# Patient Record
Sex: FEMALE | Race: WHITE | NOT HISPANIC OR LATINO | Employment: FULL TIME | URBAN - METROPOLITAN AREA
[De-identification: names, ages, dates, MRNs, and addresses within clinical notes are randomized per-mention and may not be internally consistent; named-entity substitution may affect disease eponyms.]

---

## 2020-01-01 ENCOUNTER — APPOINTMENT (EMERGENCY)
Dept: RADIOLOGY | Facility: HOSPITAL | Age: 25
End: 2020-01-01
Payer: COMMERCIAL

## 2020-01-01 ENCOUNTER — HOSPITAL ENCOUNTER (EMERGENCY)
Facility: HOSPITAL | Age: 25
Discharge: HOME | End: 2020-01-01
Attending: EMERGENCY MEDICINE | Admitting: EMERGENCY MEDICINE
Payer: COMMERCIAL

## 2020-01-01 VITALS
HEIGHT: 61 IN | HEART RATE: 92 BPM | DIASTOLIC BLOOD PRESSURE: 69 MMHG | RESPIRATION RATE: 18 BRPM | WEIGHT: 125.66 LBS | BODY MASS INDEX: 23.73 KG/M2 | OXYGEN SATURATION: 97 % | TEMPERATURE: 98.5 F | SYSTOLIC BLOOD PRESSURE: 139 MMHG

## 2020-01-01 DIAGNOSIS — S42.002A CLOSED FRACTURE OF LEFT CLAVICLE: Primary | ICD-10-CM
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
## 2020-01-01 PROCEDURE — 73000 X-RAY EXAM OF COLLAR BONE: CPT

## 2020-01-01 PROCEDURE — 99283 EMERGENCY DEPT VISIT LOW MDM: CPT

## 2020-01-01 PROCEDURE — 99283 EMERGENCY DEPT VISIT LOW MDM: CPT | Performed by: EMERGENCY MEDICINE

## 2020-01-01 PROCEDURE — 73030 X-RAY EXAM OF SHOULDER: CPT

## 2020-01-01 RX ORDER — IBUPROFEN 600 MG/1
600 TABLET ORAL ONCE
Status: COMPLETED | OUTPATIENT
Start: 2020-01-01 | End: 2020-01-01

## 2020-01-01 RX ORDER — IBUPROFEN 600 MG/1
600 TABLET ORAL EVERY 6 HOURS PRN
Qty: 30 TABLET | Refills: 0 | Status: SHIPPED | OUTPATIENT
Start: 2020-01-01

## 2020-01-01 RX ORDER — ACETAMINOPHEN 325 MG/1
650 TABLET ORAL ONCE
Status: COMPLETED | OUTPATIENT
Start: 2020-01-01 | End: 2020-01-01

## 2020-01-01 RX ORDER — HYDROCODONE BITARTRATE AND ACETAMINOPHEN 5; 325 MG/1; MG/1
1 TABLET ORAL ONCE
Status: COMPLETED | OUTPATIENT
Start: 2020-01-01 | End: 2020-01-01

## 2020-01-01 RX ORDER — HYDROCODONE BITARTRATE AND ACETAMINOPHEN 5; 325 MG/1; MG/1
1 TABLET ORAL EVERY 6 HOURS PRN
Qty: 8 TABLET | Refills: 0 | Status: SHIPPED | OUTPATIENT
Start: 2020-01-01 | End: 2020-01-11

## 2020-01-01 RX ADMIN — HYDROCODONE BITARTRATE AND ACETAMINOPHEN 1 TABLET: 5; 325 TABLET ORAL at 17:19

## 2020-01-01 RX ADMIN — IBUPROFEN 600 MG: 600 TABLET ORAL at 17:19

## 2020-01-01 RX ADMIN — ACETAMINOPHEN 650 MG: 325 TABLET, FILM COATED ORAL at 17:19

## 2020-01-01 NOTE — ED PROVIDER NOTES
History  Chief Complaint   Patient presents with    Shoulder Injury     pt presents after falling snowboarding with injury to CARMEL boudreaux  Patient is a 20-year-old female with no significant past medical history, presents to the emergency department complaining of left collarbone pain after she fell while snowboarding today  Patient reports she was helmeted and took a tumble but is not sure how she landed  Her boyfriend reports she landed on her face and chest   She is unsure if she had her helmeted head but denies loss of consciousness  Since the fall she has been having pain in the left clavicle and it is worsened by deep inhalation and with movement of her left shoulder joint  She denies any neck or back pain, chest pain, dyspnea, palpitations, cough or hemoptysis, extremity weakness or paresthesia, abdominal pain, nausea, vomiting  Rest of review of systems is negative  Patient is right-hand dominant  History provided by:  Patient and significant other   used: No    Shoulder Injury   Associated symptoms: no back pain, no fever and no neck pain        None       History reviewed  No pertinent past medical history  Past Surgical History:   Procedure Laterality Date    REPAIR FLEXOR TENDON HAND Right        History reviewed  No pertinent family history  I have reviewed and agree with the history as documented  Social History     Tobacco Use    Smoking status: Current Some Day Smoker    Smokeless tobacco: Never Used   Substance Use Topics    Alcohol use: Not Currently     Frequency: Never     Comment: occasioanlly    Drug use: Not Currently        Review of Systems   Constitutional: Negative for chills and fever  HENT: Negative for congestion, ear pain, rhinorrhea and sore throat  Eyes: Negative for pain and visual disturbance  Respiratory: Negative for cough, chest tightness, shortness of breath and wheezing      Cardiovascular: Negative for chest pain and palpitations  Gastrointestinal: Negative for abdominal pain, constipation, diarrhea, nausea and vomiting  Genitourinary: Negative for dysuria, flank pain, frequency and hematuria  Musculoskeletal: Negative for back pain and neck pain  +Left collarbone pain s/p injury  Skin: Negative for color change, pallor, rash and wound  Allergic/Immunologic: Negative for immunocompromised state  Neurological: Negative for dizziness, syncope, weakness, light-headedness, numbness and headaches  Hematological: Negative for adenopathy  Does not bruise/bleed easily  Psychiatric/Behavioral: Negative for confusion and decreased concentration  All other systems reviewed and are negative  Physical Exam  Physical Exam   Constitutional: She is oriented to person, place, and time  She appears well-developed and well-nourished  No distress  HENT:   Head: Normocephalic and atraumatic  Mouth/Throat: Oropharynx is clear and moist  No oropharyngeal exudate  Eyes: Pupils are equal, round, and reactive to light  Conjunctivae and EOM are normal    Neck: Normal range of motion  Neck supple  No JVD present  Cardiovascular: Normal rate, regular rhythm, normal heart sounds and intact distal pulses  Exam reveals no gallop and no friction rub  No murmur heard  Pulmonary/Chest: Effort normal and breath sounds normal  No respiratory distress  She has no wheezes  She has no rales  She exhibits no tenderness  Abdominal: Soft  Bowel sounds are normal  She exhibits no distension  There is no tenderness  There is no rebound and no guarding  Musculoskeletal: Normal range of motion  She exhibits no edema or tenderness  Tenderness over the left mid clavicle without obvious deformity or skin tenting  Decreased range of motion left shoulder due to pain reproduced in the clavicle  No bony tenderness of the left scapula, left chest wall, left shoulder or upper arm    All extremities are neurovascularly intact including left upper extremity  No midline cervical, thoracic or lumbar spine tenderness  Neurological: She is alert and oriented to person, place, and time  No gross motor or sensory deficits  Skin: Skin is warm and dry  No rash noted  She is not diaphoretic  No erythema  No pallor  Psychiatric: She has a normal mood and affect  Her behavior is normal    Nursing note and vitals reviewed  Vital Signs  ED Triage Vitals [01/01/20 1610]   Temperature Pulse Respirations Blood Pressure SpO2   98 5 °F (36 9 °C) 92 18 139/69 97 %      Temp Source Heart Rate Source Patient Position - Orthostatic VS BP Location FiO2 (%)   Oral Monitor Sitting Right arm --      Pain Score       8         Vitals:    01/01/20 1610 01/01/20 1611   BP: 139/69    BP Location: Right arm    Pulse: 92    Resp: 18    Temp: 98 5 °F (36 9 °C)    TempSrc: Oral    SpO2: 97%    Weight:  57 kg (125 lb 10 6 oz)   Height:  5' 1" (1 549 m)       Visual Acuity      ED Medications  Medications   ibuprofen (MOTRIN) tablet 600 mg (has no administration in time range)   acetaminophen (TYLENOL) tablet 650 mg (has no administration in time range)   HYDROcodone-acetaminophen (NORCO) 5-325 mg per tablet 1 tablet (has no administration in time range)       Diagnostic Studies  Results Reviewed     None                 XR clavicle LEFT   ED Interpretation by Steffen Holland DO (01/01 1659)   Acute midshaft left clavicle fracture  XR shoulder 2+ views LEFT   ED Interpretation by Steffen Holland DO (01/01 1659)   Acute midshaft left clavicle fracture  No osseous abnormality in the shoulder joint  Procedures  Procedures         ED Course                               MDM  Number of Diagnoses or Management Options  Closed fracture of left clavicle: Fall involving snowboard as cause of accidental injury:   Diagnosis management comments: 49-year-old female presents status post fall while snowboarding complaining of left collarbone injury    X-rays were ordered by nursing of the left shoulder and left clavicle which does show acute fracture of the midshaft left clavicle  Will place in a sling, provide ibuprofen and Tylenol for symptomatic relief  Patient is not from this area and she plans to return home either tonight or tomorrow and I advised her to follow up with an orthopedic surgeon upon return home  Discussed ED return parameters  I have reasonably determine that electronically prescribing a controlled substance would be impractical for the patient to obtain the controlled substance prescribed by electronic prescription or would cause an untimely delay resulting in an adverse impact on the patient's medical condition   Amount and/or Complexity of Data Reviewed  Tests in the radiology section of CPT®: ordered and reviewed  Independent visualization of images, tracings, or specimens: yes          Disposition  Final diagnoses:   Closed fracture of left clavicle   Fall involving snowboard as cause of accidental injury     Time reflects when diagnosis was documented in both MDM as applicable and the Disposition within this note     Time User Action Codes Description Comment    1/1/2020  5:07 PM Jose E Hong [[id number]] Closed fracture of left clavicle     1/1/2020  5:07 PM Jose E Hong [[id number]] Fall involving snowboard as cause of accidental injury       ED Disposition     ED Disposition Condition Date/Time Comment    Discharge Stable Wed Jan 1, 2020  5:07 PM Sanjiv Conley discharge to home/self care              Follow-up Information     Follow up With Specialties Details Why Contact Info Additional Information    Orthopedic surgeon  Schedule an appointment as soon as possible for a visit        [id number] Emergency Department Emergency Medicine Go to  If symptoms worsen [address] [place] [id number]  [address] [place] [address], [address] [place] [address], [facility] Patient's Medications   Discharge Prescriptions    HYDROCODONE-ACETAMINOPHEN (NORCO) 5-325 MG PER TABLET    Take 1 tablet by mouth every 6 (six) hours as needed (severe pain) for up to 10 daysMax Daily Amount: 4 tablets       Start Date: 1/1/2020  End Date: 1/11/2020       Order Dose: 1 tablet       Quantity: 8 tablet    Refills: 0    IBUPROFEN (MOTRIN) 600 MG TABLET    Take 1 tablet (600 mg total) by mouth every 6 (six) hours as needed for mild pain or moderate pain       Start Date: 1/1/2020  End Date: --       Order Dose: 600 mg       Quantity: 30 tablet    Refills: 0     No discharge procedures on file      ED Provider  Electronically Signed by           Yessenia Owens DO  01/01/20 8996

## 2021-09-27 ENCOUNTER — TRANSCRIBE ORDERS (OUTPATIENT)
Dept: SCHEDULING | Facility: REHABILITATION | Age: 26
End: 2021-09-27
Payer: COMMERCIAL

## 2021-09-27 DIAGNOSIS — S06.0X0A CONCUSSION WITHOUT LOSS OF CONSCIOUSNESS, INITIAL ENCOUNTER: Primary | ICD-10-CM

## 2021-10-21 ENCOUNTER — HOSPITAL ENCOUNTER (OUTPATIENT)
Dept: PHYSICAL THERAPY | Age: 26
Setting detail: THERAPIES SERIES
Discharge: HOME | End: 2021-10-21
Attending: PHYSICAL MEDICINE & REHABILITATION
Payer: COMMERCIAL

## 2021-10-21 ENCOUNTER — HOSPITAL ENCOUNTER (OUTPATIENT)
Dept: OCCUPATIONAL THERAPY | Age: 26
Setting detail: THERAPIES SERIES
Discharge: HOME | End: 2021-10-21
Attending: PHYSICAL MEDICINE & REHABILITATION
Payer: COMMERCIAL

## 2021-10-21 ENCOUNTER — TELEPHONE (OUTPATIENT)
Dept: REGISTRATION | Facility: CLINIC | Age: 26
End: 2021-10-21

## 2021-10-21 DIAGNOSIS — S06.0X0A CONCUSSION WITHOUT LOSS OF CONSCIOUSNESS, INITIAL ENCOUNTER: Primary | ICD-10-CM

## 2021-10-21 DIAGNOSIS — S06.0X0A CONCUSSION WITHOUT LOSS OF CONSCIOUSNESS, INITIAL ENCOUNTER: ICD-10-CM

## 2021-10-21 PROCEDURE — 97530 THERAPEUTIC ACTIVITIES: CPT | Mod: GP

## 2021-10-21 PROCEDURE — 97166 OT EVAL MOD COMPLEX 45 MIN: CPT | Mod: GO

## 2021-10-21 PROCEDURE — 97112 NEUROMUSCULAR REEDUCATION: CPT | Mod: GP

## 2021-10-21 PROCEDURE — 97161 PT EVAL LOW COMPLEX 20 MIN: CPT | Mod: GP

## 2021-10-21 RX ORDER — SUMATRIPTAN SUCCINATE 100 MG/1
100 TABLET ORAL ONCE AS NEEDED
COMMUNITY

## 2021-10-21 RX ORDER — AMITRIPTYLINE HYDROCHLORIDE 25 MG/1
25 TABLET, FILM COATED ORAL NIGHTLY
COMMUNITY

## 2021-10-21 ASSESSMENT — COGNITIVE AND FUNCTIONAL STATUS - GENERAL: AFFECT: WFL

## 2021-10-21 NOTE — OP PT TREATMENT LOG
VESTIBULAR PT FLOWSHEET    P.T. TREATMENT LOG   Precautions:mild phono/photosensitivity   Eval Date: 10/21 Progress Note: 11/21   POC expires:12/21 Insurance Limits:   Treatment Current Session Time   CANALITH  REPOSITIONING TREATMENT  (CPT 07387) Y/N Notes Not performed   CRT      NEURO RE-ED  (CPT 10725) Y/N Notes 8-22 Minutes   BPPV ASSESSMENT Y (-) for BPPV in all canals  Possible R hypofunction   VOR CANCELLATION                      Standing H/VVOR-C     Ambulation w/ H/VVOR-C     VOR / GAZE STABILITY     Standing H/VVOR  1 inch B performed seated  HVOR: SSV x 1 minute  VVOR: SSV x 1 minute   Ambulation w/H/VVOR     H/VVOR on compliant surfaces     Functional VOR     HABITUATION     Ball circles     Repetitive functional movements     BALANCE- STATIC     On floor     Airex foam     Rockerboard     BALANCE- DYNAMIC     Ambulation-head turns/nods     Ambulation - 180 & 360 degree turns     Retro ambulation EO     Ambulation with EC     Obstacles     Tandem     C/S KINESTHESIA/ COORDINATION     MULTITASKING     OKS     *Objective Measures   TBA: FGA, BCTT, DVA/GST   THER-EX   (CPT 46874) Y/N SETS REPS LOAD Not performed   STRETCHING        Chin tucks (supine, seated)        Upper Trapezius Stretch        Levator Scapula stretch        Scalene/SCM stretch        Doorway Pectoralis stretch         Open Book/ thread the needle thoracic rotation stretches                ROM        SNAGS for rotation        SNAGS for extension        Supine snow angels        Seated thoracic extension                STRENGTHENING        Cervical Stabilization        Cervical isometrics (c/s rot, flex, ext, SB, capital flex)        Resisted chin tucks w/ TB        Cervical retractions prone on elbows        Deep cervical flexor strengthening                Scapular Stabilization        Seated scap retraction/depression        No money (B/L UE ER w/ TB)        TB rows/overhead rows        TB shoulder extension         TB shoulder horiz  ABD        Standing chin tuck with shoulder flexion/abduction        W to Y's (supine or standing)        Prone T, Y, I         Modified supermans                CARDIOVASCULAR     UBE     Recumbent bike     Treadmill/BCTT     MANUAL  (CPT 01471) Y/N  Not performed   STM/MFR/TPR     IASTM     Joint Mobilizations           E-STIM UNATTENDED  (CPT ) Y/N  Not performed   TENS/ H-wave      HOT / COLD PACKS  (CPT 70883) Y/N  Not performed   Heat     Ice     THER ACT  (CPT 00993) Y/N  8-22 Minutes   Review pain, meds, falls, vitals, symptoms yes    *Subjective Measures    yes [place]: 41  ABC: 90%  DHI: 34%   Patient Education/HEP

## 2021-10-21 NOTE — TELEPHONE ENCOUNTER
offered PT/OT on 10/28  and 11/2 at 1&2pm  to makeup missed frequencies.  Requested call back to [facility], please transfer if she calls back.

## 2021-10-21 NOTE — OP OT TREATMENT LOG
VISION/CONCUSSION OT FLOW SHEET    OT Vision/mTBI  EXERCISES CURRENT SESSION TIME   NEURO RE-ED  CPT 75877 TOTAL TIME FOR SESSION 53-67 Minutes   Initial Evaluation Initial evaluation completed 10/21/21.  See OT note for findings.    Dynavision     VTS3/VTS4     CPT     BITs     NVR     Saccadic Fixation     Ocular Motor Control     Visual Tracing     3D Tracking     Visual Perception     Convergence/Divergence     Accommodation     Visual Stimulation     THER ACT  CPT 94695 TOTAL TIME FOR SESSION Not performed   Pain, Vitals, Meds, Etc.     HEP     Visual Endurance      Work/School Simulation      SELF CARE  CPT 51640 TOTAL TIME FOR SESSION Not performed   PCS Symptom Management/Education Issued and educated pt on 1-10 PCS symptom scale.  Pt demonstrates good understanding of education.    ADL/IADLs     GROUP  CPT 47281 TOTAL TIME FOR SESSION Not performed

## 2021-10-21 NOTE — PROGRESS NOTES
Referring Provider: By co-signing this Plan of Care (POC) either electronically or physically you agree to the following:    I have reviewed the the Plan of Care established by the therapist within this document and certify that the services are skilled and medically necessary. I have reviewed the plan and recommend that these services continue to meet the goals stated in this document.       EXTERNAL PROVIDER FAXING BACK:    PHYSICIAN SIGNATURE: __________________________________     DATE: ___________________   TIME: _________    IMPORTANT:  If returning this Plan of Care by fax, please fax back ONLY the signature page.   _____________________________________________________________________      [facility] Fax: [phone]      OT EVALUATION FOR OUTPATIENT THERAPY    Patient: Trini Valenzuela   MRN: [id number]  : 1995 26 y.o.  Referring Physician: Dipesh Roth DO  Date of Visit: 10/21/2021    Certification Dates:   10/21/21 through 22    Recommended Frequency & Duration:  Other for up to 3 months   1-2x/wk    Diagnosis:   1. Concussion without loss of consciousness, initial encounter          Chief Complaints:   Chief Complaint   Patient presents with   • Visual Deficits   • Cognition       Precautions: no known precautions/restrictions    Past Medical History: No past medical history on file.    Past Surgical History: No past surgical history on file.      LEARNING ASSESSMENT    Assessment completed: Yes    Learner name:  Trini    Relationship: Patient    Learning Barriers:  Learning barriers: No Barriers    Preferred Language: English     Needed: No    Learning New Concepts: Listening and Reading        CO-LEARNER ASSESSMENT:    Completed: No            OBJECTIVE MEASUREMENTS/DATA:     Assessment - 10/21/21 1214        Assessment    Plan of Care reviewed and patient/family in agreement Yes     System Pathology/Pathophysiology Noted neuromuscular   vision system     Functional Limitations in Following Categories work; community/leisure     Problem List: Occupational Therapy impaired cognition   visual deficits    Rehab Potential/Prognosis: Occupational Therapy good, to achieve stated therapy goals     Clinical Assessment Pt is a 26 y.o. female that presents to OP OT for an initial evaluation s/p fall from chair resulting in a concussion.  Pt c/o mild to  moderate ocular discomfort in nearly all directions.  Her eye teaming is impaired including convergence, divergence, accommodations, and smooth pursuits, as well as her saccades and KD are slow.  She reports moderate to severe symptoms as seen on the [place] with a score of 41.  She is currently working 6hrs/day x 5days/wk but reports she is very fatigued when she is finished working.  She has been able to get back to light physical activity but reports feelings of off balance with any cardio or endurance based activities.  Pt would benefit from OP OT to maximize functional vision for return to work, IADLs, and leisure pursuits.  Did not complete DYNAVISION due to time constraint.  Will need to complete at next session.     Plan and Recommendations Complete DYNAVISION at next session.     Planned Services CPT 24744 Neuromuscular Reeducation; CPT 00464 Self-care/Home management training; CPT 40482 Therapeutic activities; CPT 22841 Hot/Cold Packs therapy                General Information - 10/21/21 1214        General Information    Document Type initial evaluation     Onset of Illness/Injury or Date of Surgery 09/02/21     Referring Physician Dr. Roth     Pertinent History of Current Functional Problem Pt is a 26 y.o. female that was out to dinner on 9/2/21.   She was sitting at a hightop table when her chair fell backwards and she hit her head on the concrete.  She saw her PCP a few days later and was diagnosed with a concussion, CT head (-).  She was referred to Dr. Roth who referred pt to OP OT and PT.  She is back  to working 6hrs/day x 5days/wk.  Her symptoms have improved since initial incident and she has resumed light physical activity.  She continues to have moderate PCS and reports taking breaks throughout her day.     Patient/Family/Caregiver Comments/Observations Pt arrives to OP OT for IE.     Limitations/Impairments visual     Existing Precautions/Restrictions no known precautions/restrictions        OT Time Calculation    Start Time 1202     Stop Time 1300     Time Calculation (min) 58 min                Pain/Vitals - 10/21/21 1214        Pain Assessment    Currently in pain Yes     Preferred Pain Scale number (Numeric Rating Pain Scale)     Pain: Body location Head     Pain Rating (0-10): Pre Activity 1     Pain Rating (0-10): Activity 2     Pain Rating (0-10): Post Activity 2        Pain Interventions    Intervention  rest prn, low stim     Post Intervention Comments same as above                OT - 10/21/21 1214        Occupational Therapy Encounter Type Details    Occupational Therapy Neuro     Document Type initial evaluation        OT Frequency and Duration    Frequency of treatment Other     OT Duration 3 months     OT Cert From 10/21/21     OT Cert To 01/19/22     OT Custom Frequency and Duration 1-2x/wk     Date OT POC was sent to provider 10/21/21     Signed OT Plan of Care received?  No                Falls Assessment - 10/21/21 1214        Initial Falls Assessment    One or more falls in the last year No                 PLOF:    Prior Level of Function - 10/21/21 1214        OTHER    Previous level of function Pt was independent with all ADLs and IADLs, driving unlimited distances, working FT               Living Environment    Living Environment - 10/21/21 1214        Living Environment    People in Home alone     Living Arrangements apartment     Living Environment Comment Elevator access but pt typically uses steps.  She does get dizzy occasionaly     Transportation Concerns car, none                Cognition     Cognition - 10/21/21 1214        Cognition/Psychosocial    Affect/Mental Status (Cognition) WFL     Behavioral Issues (Cognition) difficulty managing stress; overwhelmed easily     Orientation Status (Cognition) oriented x 4     Follows Commands (Cognition) increased processing time needed; repetition of directions required     Cognitive Function attention deficit     Attention Deficit (Cognition) concentration; focused/sustained attention; selective attention; divided attention; alternating attention     Comment, Attention Difficulty keeping attention when multi-tasking               Reading and Writing     Reading and Writing - 10/21/21 1214        Reading and Writing Assessment    Reading (comments) Pt is able to tolerate reading both hard copy and on screens.  She is able to tolerate for >1hr.  Screens are dimmed and pt uses blue light glasses.     Writing (comments) Pt has no difficulty with physically writing/typing/texting.  She reports increased time and frequent errors when getting her thoughts out into words.               BADL/IADL    BADL/IADL - 10/21/21 1214        BADL Interventions Assessment    Upper Body Dressing Independent     Lower Body Dressing Independent     Bathing Independent     Toileting Independent     Grooming Independent     Eating Independent        IADL/Home Interventions Assessment    Driving and community mobility comments Pt is driving local distances only.     Financial management comments Pt report no issues.     Medication management comments Pt reports no issues.     Home management/maintenance comments Pt reports no issues.     Meal prep / clean up comments Pt reports no issues.               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  She currently works from home.     Leisure / Social Participation (comments) Pt  enjoys going out to eat, to bars, listening to music, and working out.     Exercise Assessment (comments) Pt enjoys working out and has been able to get back to free weights but not cardio.  She has tried getting back into cardio but is able to tolerate it for only a few minutes before her HA kicks in.  She is able to walk on the treadmill for >30min but does feel slightly off balance.               Vision     Vision - 10/21/21 1214        Vision Assessment    Visual Impairment/Limitations corrective lenses for distance   has non-RX blue light glasses for the computer       Oculomotor Control    Left eye assessed? Yes     Right eye assessed? Yes     Superior assessed? Yes     Inferior assessed? Yes     Diagonal assessed? Yes     Circular assessed? Yes     Left AROM without target ROM Intact     Left oculomotor AROM Discomfort Mild   squinting    Left gaze fixation (10 second hold) Able      Right AROM without target ROM Intact      Right oculomotor AROM Discomfort None     Right gaze fixation (10 second hold) Able     Superior AROM without target ROM Intact      Superior oculomotor AROM Discomfort Moderate   squinting    Superior gaze fixation (10 second hold) Able     Inferior AROM without target ROM Intact     Inferior oculomotor AROM Discomfort Mild     Inferior gaze fixation (10 second hold) Able     Diagonal AROM without target ROM Intact     Diagonal oculomotor AROM Discomfort Moderate     Diagonal gaze fixation (10 second hold) Able     Circular AROM without target ROM Impaired   choppy    Circular oculomotor AROM Discomfort None        Eye Teaming    Convergence Impaired   8''    Divergence Impaired     Accommodation Impaired   1 error; slow; difficulty tracking    Smooth Pursuits Impaired   difficulty maintaining target fixation    3D Tracking Intact     Nystagmus No        Saccadic Fixation Speed    Vertical Saccadic Fixation Impaired     Horizontal Saccadic Fixation Impaired     Horizontal Saccades H1  11.04 Seconds     Horizontal Saccades H2 10.41 Seconds     Horizontal Saccades H3 11.58 Seconds     Horizontal Saccades H4 8.94 Seconds     Horizontal Saccades trials average 10.49 Seconds     Vertical Saccades V1 10.14 Seconds     Vertical Saccades V2 9.71 Seconds     Vertical Saccades V3 8.78 Seconds     Vertical Saccades V4 8.68 Seconds     Vertical Saccades trials average 9.33 Seconds     Devendra Devick Test #1 (seconds) 23.37 Seconds     Devendra Devick Test #2 (seconds) 27.41 Seconds     Devendra Devick Test #3 (seconds) 32.65 Seconds     Devendra Devick Total Time 83.43 Seconds     Devendra Devick Errors #1 0     Devendra Devick Errors #2 0     Devendra Devick Errors #3 0     Devendra Devick Total Errors 0        Symptoms and Outcome Measures    Symptoms Anxiety; Cognitive changes; Cognitive Fatigue; Dizziness; Fatigue; Fogginess; Headache; Imbalance; Irritability; Mood swings; Multi-stimulus intolerance; Phonophobia; Photophobia; Sleep disturbance; Slowed processing; Visual changes     [place] Score 41                   GOALS:    Goals        Patient Stated    •  <enter goal here> (pt-stated)        Other    •  Mutually agreed upon pain goal       Mutually agreed upon pain goal: 0/10      •  OT goals              Short Term Goals Time Frame Result Comment/Progress   Full ocular motor range of motion and control with mild discomfort 4 weeks       Visual reaction time within or less than .85 second via Dynavision with minimal symptoms 4 weeks       Saccadic fixation speed of less than 57 seconds as measured by the Devendra Devick Test with minimal symptoms 4 weeks      Convergence less than or equal to 7 inches for near-focus tasks of reading and computer use with minimal symptoms 4 weeks     Visual perceptual skills via MVTP4 with standard score equivalent to age range 4 weeks     Patient will perform IADLs, home management and community activities with mild symptoms as evidenced by a 5-10pt reduction on the [place] Post Concussion  Questionnaire 4 weeks        Long Term Goals Time Frame Result Comment/Progress   Patient will complete necessary reading for work/leisure/school, with accommodations if indicated, with absent or manageable symptoms 12 weeks   with ability to read for > 60 minutes without + in PCS symptoms   Patient will utilize computer for work/leisure/school tasks with absent or manageable symptoms 12 weeks   with ability to utilize computer for > 60 minutes without + in PCS symptoms   Patient will return to work/school with full or modified duties with absent or manageable symptoms 12 weeks       Patient will perform IADLs and community activities with absent or manageable symptoms; as measured by improvement in [place] Questionnaire by <20 points. 12 weeks       Patient will be independent with visual home exercise program 12 weeks       Visual reaction time within or less than .75 seconds via Dynavision with minimal symptoms 12 weeks                      TREATMENT PLAN:    VISION/CONCUSSION OT FLOW SHEET    OT Vision/mTBI  EXERCISES CURRENT SESSION TIME   NEURO RE-ED  CPT 13240 TOTAL TIME FOR SESSION 53-67 Minutes   Initial Evaluation Initial evaluation completed 10/21/21.  See OT note for findings.    Dynavision     VTS3/VTS4     CPT     BITs     NVR     Saccadic Fixation     Ocular Motor Control     Visual Tracing     3D Tracking     Visual Perception     Convergence/Divergence     Accommodation     Visual Stimulation     THER ACT  CPT 66167 TOTAL TIME FOR SESSION Not performed   Pain, Vitals, Meds, Etc.     HEP     Visual Endurance      Work/School Simulation      SELF CARE  CPT 08385 TOTAL TIME FOR SESSION Not performed   PCS Symptom Management/Education Issued and reviewed 1-10 PCS symptom scale.  Pt demonstrates good understanding of education.     ADL/IADLs     GROUP  CPT 72545 TOTAL TIME FOR SESSION Not performed                                                                                          This 26 y.o.  year old female presents to OT with above stated diagnosis. Occupational Therapy evaluation reveals impaired cognition (visual deficits) resulting in work, community/leisure limitations. Trini Valenzuela will benefit from skilled OT services to address limitation, work towards rehab and patient goals and maximize PLOF of chosen ADLs.     Planned Services: The patient’s treatment will include CPT 50947 Neuromuscular Reeducation, CPT 14895 Self-care/Home management training, CPT 93224 Therapeutic activities, CPT 93399 Hot/Cold Packs therapy, .

## 2021-10-21 NOTE — PROGRESS NOTES
Referring Provider: By co-signing this Plan of Care (POC) either electronically or physically you agree to the following:    I have reviewed the the Plan of Care established by the therapist within this document and certify that the services are skilled and medically necessary. I have reviewed the plan and recommend that these services continue to meet the goals stated in this document.       EXTERNAL PROVIDER FAXING BACK:    PHYSICIAN SIGNATURE: __________________________________     DATE: ___________________  TIME: _____________    IMPORTANT:  If returning this Plan of Care by fax, please fax back ONLY the signature page.   _________________________________________________________________________      [facility] Fax: [phone]        PT EVALUATION FOR OUTPATIENT THERAPY    Patient: Trini Valenzuela    MRN: [id number]  : 1995 26 y.o.   Referring Physician: Dipesh Roth DO  Date of Visit: 10/21/2021      Certification Dates:  10/21/21 through 21         Recommended Frequency & Duration:  Other (1-2x/week) for up to 8 weeks     Diagnosis:   1. Concussion without loss of consciousness, initial encounter        Chief Complaints:   Chief Complaint   Patient presents with   • Dizziness   • Pain     headache   • Impaired Physiological Response To Inc Hr   • Decreased recreational/play activity       Precautions: no known precautions/restrictions    Past Medical History: History reviewed. No pertinent past medical history.    Past Surgical History: History reviewed. No pertinent surgical history.      LEARNING ASSESSMENT    Assessment completed:  Yes    Learner name:  Trini    Relationship: Patient    Learning Barriers:  Learning barriers: No Barriers    Preferred Language: English     Needed: No    Learning New Concepts: Listening, Reading, Demonstration and Pictures/Video    Education Provided: Role of PT, Vestibular anatomy and pathophysiology of concussion. Issued  Concussion folder with Not Just a Bump on the Head pamphlet and additional handouts.       CO-LEARNER ASSESSMENT:    Completed: No            OBJECTIVE MEASUREMENTS/DATA:     Assessment and Plan - 10/21/21 1522        Assessment    Plan of Care reviewed and patient/family in agreement Yes     System Pathology/Pathophysiology Noted vestibular; cardiovascular     Functional Limitations in Following Categories (PT Eval) self-care; home management; work; community/leisure     Rehab Potential/Prognosis good, to achieve stated therapy goals     Problem List decreased endurance; dizziness; gaze stabilization; impaired balance; impaired motor control; impaired postural control; impaired sensory feedback; motion sensitivity     Clinical Assessment Trini presents to outpatient vestibular PT following a concussion where she fell backwards and hit her head in September 2021. At this time primary complaints reflect foggiess, dizziness, and limited activity tolerance as well as sleep, phonophobia and balance impairments. Objective measures this date are consistent with right vestibular hypofunction following trauma and from subjective report, considering impaired physiologic response to changes in heart rate. Additional testing is required to ascertain deficits in Dynamic Visual Acuity, Gaze Stabilization, Functional Gait Assessment and BCTT. She will benefit from a course of care at outpatient vestibular PT to address impairments and optimize mobility.     Plan and Recommendations Assess DVA/GST, Issue walking program for 30-40% max HR to prepare for BCTT.     Planned Services CPT 04880 Canalith repositioning procedure/maneuvers; CPT 70126 Gait training; CPT 84542 Manual therapy; CPT 96813 Neuromuscular Reeducation; CPT 18471 Therapeutic activities; CPT 51077 Therapeutic exercises; CPT 46928 Therapeutic Massage; CPT 76830 Hot/Cold Packs therapy                General Information - 10/21/21 1522        Session Details     Document Type initial evaluation     Mode of Treatment physical therapy     Patient/Family/Caregiver Comments/Observations Patient received via OT handoff     OP Specialty Concussion        Time Calculation    Start Time 1300     Stop Time 1355     Time Calculation (min) 55 min        General Information    Onset of Illness/Injury or Date of Surgery 09/02/21     Referring Physician Dr. Roth     Pertinent History of Current Functional Problem Per Dr. Roth: She is a 26-year-old female who sustained a concussion on 9/2/2021 as result of falling backwards out of a high back chair in a restaurant. She struck her head on the floor. She not experience any loss of consciousness, seizure activity, or posttraumatic amnesia. She not present to the ER. No brain imaging has been obtained. She followed up with her primary care physician on 9/7/2021. She was diagnosed with a concussion and advised on relative cognitive and physical rest. A non-contrast head CT was scheduled for 9/28/2021. She took the first week following her injury off from work then attempted to return to work full-time but was unable to do so due to increased symptoms. She resume working half days on 9/20/2021. Currently her symptoms include headaches which she describes as located over the vertex, moderate to severe in severity, pressure-like in quality, constant, and not significantly altered with Tylenol. She is endorsing dizziness which she describes more of a lightheadedness and fogginess; she denies any vertigo, loss of balance, falls. She does admit to visual changes with blurred vision and photophobia; she denies any diplopia. She does wear reading glasses at baseline and her last eye exam was in December 2020. She is experiencing phonophobia, cognitive slowing and fogging, impaired concentration memory, excessive fatigue, and mood instability with feelings of increased irritability and anxiety as well as insomnia with difficulty with sleep  induction and maintenance. Her symptom severity score as rated on scat 5 was 51. Her reading tolerance is 30 to 60 minutes in duration. She is working part-time albeit with increased symptoms as noted above. She has resumed driving. There has been no head injury subsequent to that occurred on 9/2/2021     Limitations/Impairments visual     Existing Precautions/Restrictions no known precautions/restrictions                Pain/Vitals - 10/21/21 1522        Pain Assessment    Currently in pain Yes     Preferred Pain Scale number (Numeric Rating Pain Scale)     Pain Side/Orientation right     Pain: Body location Head; Eye     Pain Rating (0-10): Pre Activity 1     Pain Rating (0-10): Activity 2     Pain Rating (0-10): Post Activity 2     Nonverbal Indicators of Pain grimace; guarding; restless        Pain Intervention    Intervention  IE     Post Intervention Comments IE                Falls - 10/21/21 1236        Initial Falls Assessment    One or more falls in the last year Yes     How many times 1     Was the patient injured in any fall Yes     Recommended plan to address falls Concussion Rehab                PT - 10/21/21 1522        Physical Therapy    Physical Therapy Vestibular        PT Plan    Frequency of treatment Other   1-2x/week    PT Duration 8 weeks     PT Cert From 10/21/21     PT Cert To 12/20/21     Date PT POC was sent to provider 10/21/21     Signed PT Plan of Care received?  No                   Living Environment    Living Environment - 10/21/21 1236        Living Environment    People in Home alone     Living Arrangements apartment     Living Environment Comment Elevator access but pt typically uses steps. She does get dizzy occasionaly     Transportation Concerns car, none               PLOF:    Prior Level of Function - 10/21/21 1522        OTHER    Previous level of function Pt was independent with all ADLs and IADLs, driving unlimited distances, working FT; Enjoys exercise               ROM     Range of Motion - 10/21/21 1500        Cervical AROM    Comments:  Grossly WNL, dyskinesia present with rotation               Vestibular     PT Vestibular Evaluation - 10/21/21 1300        Imaging    Imaging studies CT Scan     Imaging comments No acute intracranial pathology.        Vestibular Symptoms    Symptoms Dizziness; Lightheadedness; Imbalance; Headache; Sleep Disorders; Phonophobia; Anxiety; Emotional changes; Irritability; Mood swings; Multi-stimulus intolerance; Fogginess; Slowed processing        Vestibular/Ocular    Corrective lenses --   driving    Eye ROM WNL     Comments feels like blinking takes longer to refocus     Convergence Vision ABN     Comments >12 cm     Divergent Vision WNL     Pupil Alignment WNL     Cover/Uncover ABN     Cover/Uncover comments R esotropia     Smooth Pursuit/Small Target Abnormal     Comments strain to R and up     Saccades Abnormal     Comments R hypometric     Vestibular Ocular Reflex (VOR) Abnormal     Comments painful, increases headache.     Head Thrust Test WNL        Frenzel's Exam    Spontaneous Nystagmus Abnormal     Comments nystagmus with no directional preponderance     Head Shaking Test WNL     Comments symptomatic 4/5     Convergence Spasm Abnormal     Comments R eye with spasm     Valsalva/Fistula Test WNL        BPPV    Right [place]  WNL     Left [place]  WNL     Sit to Supine WNL     Sit to Supine comments --     Horizontal Roll Test to Right  Abnormal     Horizontal Roll Test to Right Comments 3/5 dizziness     Horizontal Roll Test to Left  WNL     Interpretation of Results (-) for positional vertigo, subjective report of dizziness but unable to visualize nystagmus        Motion Sensitivity    Motion Sensitivity Quotient Percentage (%) 27 percent     Motion Sensitivity number of provoking positions 9        Other Outcome Measures    Activities Balance Confidence  90     Dizziness Handicap Inventory Score 34     Rivermead Score 41      Rivermead Comments 3,2,0,3,3,3,3,3,3,2,2,2,3,2,2,2,(slow movements 3)                 Goals     • Vestibular PT goals 2021      Short Term Goals: 4-6 weeks Result Comment/Progress   Patient will decrease Motion Sensitivity Quotient to <5 % for increased functional tolerance.       Patient will increase Balance Master SOT composite score to TBA for increased postural control.      Patient will increase FGA score to TBA for increased gait stability and balance.       Patient will decrease DVA to TBA for functional improved gaze stability.     Patient will increase GST scores to TBA for improved gaze stability.       Patient will perform home exercise program with supervision.       Patient will tolerate 30 minutes of cardiovascular conditioning at 40-60% max HR     Patient will be negative for BPPV in all canals to reduce symptoms with functional mobility.      Patient will improve score on [place]mead to <30 for decreased report of symptoms with daily activities.         Long Term Goals: 8-12 weeks Result Comment/Progress   Patient will decrease Motion Sensitivity Quotient to <2% for increased functional tolerance.       Patient will increase Balance Master SOT composite score to WNL for increased postural control.     Patient will increase FGA score to >28/30 for increased gait stability and balance.       Patient will decrease DVA to <2 lines for functional improved gaze stability.     Patient will increase GST scores to 150 for improved gaze stability.       Patient will perform home exercise program independently.       Patient will tolerate 60 minutes of cardiovascular conditioning at 60-75% HR max     Patient will demonstrate independence with managing any residual symptoms.     Patient will return to work full time     Patient will improve score on Rivermead to <15 for decreased report of symptoms with daily activities.     Patient will have no increased symptoms with challenging VOR activities for symptom free  high level activities.                             TREATMENT PLAN:    VESTIBULAR PT FLOWSHEET    P.T. TREATMENT LOG   Precautions:mild phono/photosensitivity   Eval Date: 10/21 Progress Note: 11/21   POC expires:12/21 Insurance Limits:   Treatment Current Session Time   CANALITH  REPOSITIONING TREATMENT  (CPT 11099) Y/N Notes Not performed   CRT      NEURO RE-ED  (CPT 49661) Y/N Notes 8-22 Minutes   BPPV ASSESSMENT Y (-) for BPPV in all canals  Possible R hypofunction   VOR CANCELLATION                      Standing H/VVOR-C     Ambulation w/ H/VVOR-C     VOR / GAZE STABILITY     Standing H/VVOR  1 inch B performed seated  HVOR: SSV x 1 minute  VVOR: SSV x 1 minute   Ambulation w/H/VVOR     H/VVOR on compliant surfaces     Functional VOR     HABITUATION     Ball circles     Repetitive functional movements     BALANCE- STATIC     On floor     Airex foam     Rockerboard     BALANCE- DYNAMIC     Ambulation-head turns/nods     Ambulation - 180 & 360 degree turns     Retro ambulation EO     Ambulation with EC     Obstacles     Tandem     C/S KINESTHESIA/ COORDINATION     MULTITASKING     OKS     *Objective Measures   TBA: FGA, BCTT, DVA/GST   THER-EX   (CPT 08717) Y/N SETS REPS LOAD Not performed   STRETCHING        Chin tucks (supine, seated)        Upper Trapezius Stretch        Levator Scapula stretch        Scalene/SCM stretch        Doorway Pectoralis stretch         Open Book/ thread the needle thoracic rotation stretches                ROM        SNAGS for rotation        SNAGS for extension        Supine snow angels        Seated thoracic extension                STRENGTHENING        Cervical Stabilization        Cervical isometrics (c/s rot, flex, ext, SB, capital flex)        Resisted chin tucks w/ TB        Cervical retractions prone on elbows        Deep cervical flexor strengthening                Scapular Stabilization        Seated scap retraction/depression        No money (B/L UE ER w/ TB)        TB  rows/overhead rows        TB shoulder extension         TB shoulder horiz ABD        Standing chin tuck with shoulder flexion/abduction        W to Y's (supine or standing)        Prone T, Y, I         Modified supermans                CARDIOVASCULAR     UBE     Recumbent bike     Treadmill/BCTT     MANUAL  (CPT 90568) Y/N  Not performed   STM/MFR/TPR     IASTM     Joint Mobilizations           E-STIM UNATTENDED  (CPT ) Y/N  Not performed   TENS/ H-wave      HOT / COLD PACKS  (CPT 15376) Y/N  Not performed   Heat     Ice     THER ACT  (CPT 04542) Y/N  8-22 Minutes   Review pain, meds, falls, vitals, symptoms yes    *Subjective Measures    yes Rivermead: 41  ABC: 90%  DHI: 34%   Patient Education/HEP             ASSESSMENT:    This 26 y.o. year old female presents to PT with above stated diagnosis. Physical Therapy evaluation reveals decreased endurance, dizziness, gaze stabilization, impaired balance, impaired motor control, impaired postural control, impaired sensory feedback, motion sensitivity resulting in self-care, home management, work, community/leisure limitations. Trini Valenzuela will benefit from skilled PT services to address limitation, work towards rehab and patient goals and maximize PLOF of chosen ADLs.     Planned Services: The patient's treatment will include CPT 36199 Canalith repositioning procedure/maneuvers, CPT 38570 Gait training, CPT 30194 Manual therapy, CPT 27577 Neuromuscular Reeducation, CPT 48910 Therapeutic activities, CPT 67189 Therapeutic exercises, CPT 92639 Therapeutic Massage, CPT 46941 Hot/Cold Packs therapy, .

## 2021-10-22 ENCOUNTER — TELEPHONE (OUTPATIENT)
Dept: REGISTRATION | Facility: CLINIC | Age: 26
End: 2021-10-22

## 2021-10-28 ENCOUNTER — HOSPITAL ENCOUNTER (OUTPATIENT)
Dept: PHYSICAL THERAPY | Age: 26
Setting detail: THERAPIES SERIES
Discharge: HOME | End: 2021-10-28
Attending: PHYSICAL MEDICINE & REHABILITATION
Payer: COMMERCIAL

## 2021-10-28 ENCOUNTER — HOSPITAL ENCOUNTER (OUTPATIENT)
Dept: OCCUPATIONAL THERAPY | Age: 26
Setting detail: THERAPIES SERIES
Discharge: HOME | End: 2021-10-28
Attending: PHYSICAL MEDICINE & REHABILITATION
Payer: COMMERCIAL

## 2021-10-28 DIAGNOSIS — S06.0X0A CONCUSSION WITHOUT LOSS OF CONSCIOUSNESS, INITIAL ENCOUNTER: Primary | ICD-10-CM

## 2021-10-28 PROCEDURE — 97530 THERAPEUTIC ACTIVITIES: CPT | Mod: GP

## 2021-10-28 PROCEDURE — 97112 NEUROMUSCULAR REEDUCATION: CPT | Mod: GO

## 2021-10-28 PROCEDURE — 97110 THERAPEUTIC EXERCISES: CPT | Mod: GP

## 2021-10-28 PROCEDURE — 97112 NEUROMUSCULAR REEDUCATION: CPT | Mod: GP

## 2021-10-28 PROCEDURE — 97530 THERAPEUTIC ACTIVITIES: CPT | Mod: GO

## 2021-10-28 NOTE — PROGRESS NOTES
PT DAILY NOTE FOR OUTPATIENT THERAPY    Patient: Trini Valenzuela   MRN: [id number]  : 1995 26 y.o.  Referring Physician: Dipesh Roth DO  Date of Visit: 10/28/2021    Certification Dates: 10/21/21 through 21    Diagnosis:   1. Concussion without loss of consciousness, initial encounter        Chief Complaints:  PPCS    Precautions:   Existing Precautions/Restrictions: no known precautions/restrictions     TODAY'S VISIT     History/Vitals/Pain/Encounter Info - 10/28/21 1316        Injury History/Precautions/Daily Required Info    Primary Therapist Itzel Zapata PT DPT     Chief Complaint/Reason for Visit  PPCS     Onset of Illness/Injury or Date of Surgery 21     Referring Physician Dr. Roth     Existing Precautions/Restrictions no known precautions/restrictions     Pertinent History of Current Functional Problem Per Dr. Roth: She is a 26-year-old female who sustained a concussion on 2021 as result of falling backwards out of a high back chair in a restaurant. She struck her head on the floor. She not experience any loss of consciousness, seizure activity, or posttraumatic amnesia. She not present to the ER. No brain imaging has been obtained. She followed up with her primary care physician on 2021. She was diagnosed with a concussion and advised on relative cognitive and physical rest. A non-contrast head CT was scheduled for 2021. She took the first week following her injury off from work then attempted to return to work full-time but was unable to do so due to increased symptoms. She resume working half days on 2021. Currently her symptoms include headaches which she describes as located over the vertex, moderate to severe in severity, pressure-like in quality, constant, and not significantly altered with Tylenol. She is endorsing dizziness which she describes more of a lightheadedness and fogginess; she denies any vertigo, loss of balance, falls. She does admit  to visual changes with blurred vision and photophobia; she denies any diplopia. She does wear reading glasses at baseline and her last eye exam was in December 2020. She is experiencing phonophobia, cognitive slowing and fogging, impaired concentration memory, excessive fatigue, and mood instability with feelings of increased irritability and anxiety as well as insomnia with difficulty with sleep induction and maintenance. Her symptom severity score as rated on scat 5 was 51. Her reading tolerance is 30 to 60 minutes in duration. She is working part-time albeit with increased symptoms as noted above. She has resumed driving. There has been no head injury subsequent to that occurred on 9/2/2021     OP Specialty Concussion     Document Type daily treatment     Patient/Family/Caregiver Comments/Observations Mild headache today at 3/10. No baseline dizziness. Going to the gym, doing light weights and riding the stationary bike.     Start Time 1306     Stop Time 1401     Time Calculation (min) 55 min     Patient reported fall since last visit No        Pain Assessment    Currently in pain Yes     Preferred Pain Scale number (Numeric Rating Pain Scale)     Pain Side/Orientation bilateral;upper     Pain: Body location Head     Pain Rating (0-10): Pre Activity 3     Pain Rating (0-10): Post Activity 3        Pain Intervention    Intervention  monitor     Post Intervention Comments no change at end of session        Pre Activity Vital Signs    Pulse 70     Oxygen Therapy None (Room air)     /62     BP Location Left upper arm     BP Method Automatic     Patient Position Sitting                Daily Treatment Assessment and Plan - 10/28/21 1316        Daily Treatment Assessment and Plan    Progress toward goals Progressing     Daily Outcome Summary Trini was assessed on the FGA today and scored at a 22/30. She is cautious with her gait speeds and this lowered her scores on many of the skills and not deviation. Rukhsana  was able to do the VOR in standing but at a slow speed due to difficulty maintaining target clarity. Initial treadmill trial was completed with no change in symptoms and  a walking program issued for home. She was instructed to back off on the time she goes to the gym and to keep weights low to avoid headache onset. Cervical ROM was WNL but mod- sev muscle guarding noted on the right side. Rukhsana denied cervical pain.    Plan and Recommendations Hold on DVA/GST until improved VOR X 1, assess JPE, progress TM to BCTT if no latent symptoms from initial trial, progress to metronome with VOR and walking to and from target then issue for home                     OBJECTIVE DATA TAKEN TODAY:    ROM    Range of Motion - 10/28/21 1300        Cervical AROM    Comments:  WNL all motions and pain free               Palpation    Palpation - 10/28/21 1300        Palpation    Neck Palpation  Mod- sev mm tight ness right UT, Alar and transverse ligaments= (-) for laxity               Vestibular     PT Vestibular Evaluation - 10/28/21 1300        Balance    FGA Score (out of 30 total) 22                 Today's Treatment:    VESTIBULAR PT FLOWSHEET    P.T. TREATMENT LOG   Precautions:mild phono/photosensitivity   Eval Date: 10/21 Progress Note: 11/21   POC expires:12/21 Insurance Limits:   Treatment Current Session Time   CANALITH  REPOSITIONING TREATMENT  (CPT 32447) Y/N Notes Not performed   CRT      NEURO RE-ED  (CPT 94312) Y/N Notes 23-37 Minutes   BPPV ASSESSMENT  (-) for BPPV in all canals  Possible R hypofunction   VOR CANCELLATION                      Standing H/VVOR-C     Ambulation w/ H/VVOR-C     VOR / GAZE STABILITY     Standing H/VVOR y 1 inch B performed standing FA:  HVOR: SSV x 1 minute  VVOR: SSV x 1 minute   Ambulation w/H/VVOR y Holding 1 inch B on reacher with:  - HVOR 30 feet X 2- slow pace  - VVOR 30 feet X 2- slow pace   H/VVOR on compliant surfaces     Functional VOR     HABITUATION     Ball circles      Repetitive functional movements     BALANCE- STATIC     On floor     Airex foam     Rockerboard     BALANCE- DYNAMIC     Ambulation-head turns/nods     Ambulation - 180 & 360 degree turns     Retro ambulation EO y Gait with EO and retro walk with hand near wall X 10 steps   Ambulation with EC y Gait with EC X 10 steps with hand near wall   Obstacles     Tandem     C/S KINESTHESIA/ COORDINATION     MULTITASKING     OKS     *Objective Measures  y 10-28-21: FGA=22/30  TBA: BCTT, DVA/GST   THER-EX   (CPT 52190) Y/N SETS REPS LOAD 8-22 Minutes   STRETCHING        Chin tucks (supine, seated)        Upper Trapezius Stretch        Levator Scapula stretch        Scalene/SCM stretch        Doorway Pectoralis stretch         Open Book/ thread the needle thoracic rotation stretches                ROM        SNAGS for rotation        SNAGS for extension        Supine snow angels        Seated thoracic extension                STRENGTHENING        Cervical Stabilization        Cervical isometrics (c/s rot, flex, ext, SB, capital flex)        Resisted chin tucks w/ TB        Cervical retractions prone on elbows        Deep cervical flexor strengthening                Scapular Stabilization        Seated scap retraction/depression        No money (B/L UE ER w/ TB)        TB rows/overhead rows        TB shoulder extension         TB shoulder horiz ABD        Standing chin tuck with shoulder flexion/abduction        W to Y's (supine or standing)        Prone T, Y, I         Modified supermans                CARDIOVASCULAR     UBE     Recumbent bike     Treadmill/BCTT y TM at 2.2 mph X 10 minutes- no head  motion and no UE support RPE=6   MANUAL  (CPT 92339) Y/N  Not performed   STM/MFR/TPR     IASTM     Joint Mobilizations           E-STIM UNATTENDED  (CPT ) Y/N  Not performed   TENS/ H-wave      HOT / COLD PACKS  (CPT 07828) Y/N  Not performed   Heat     Ice     THER ACT  (CPT 72767) Y/N  8-22 Minutes   Review pain, meds,  falls, vitals, symptoms yes    Cervical Assessment yes Transverse and Alar ligaments (-) for laxity  Cervical ROM WNL all planes  Mod-sev muscular tightness right UT   *Subjective Measures     Rivermead: 41  ABC: 90%  DHI: 34%   Patient Education/HEP yes 10-28-21: Issued VOR with 1 inch B at SSV in standing with FA to be done 5x/day. Walking with EC, retro walk, walking program.

## 2021-10-28 NOTE — PATIENT INSTRUCTIONS
Patient Education   Gaze Stabilization: Tip Card    1.Target must remain in focus, not blurry, and appear stationary while head is in motion.  2.Perform exercises with small head movements (45° to either side of midline).  3.Increase speed of head motion so long as target is in focus.  4.If you wear eyeglasses, be sure you can see target through lens (therapist will give specific instructions for bifocal / progressive lenses).  5.These exercises may provoke dizziness or nausea. Work through these symptoms. If too dizzy, slow head movement slightly. Rest between each exercise.  6.Exercises demand concentration; avoid distractions.  7.For safety, perform standing exercises close to a counter, wall, corner, or next to someone.    Copyright © I. All rights reserved.        Patient Education   Gaze Stabilization: Standing Feet Apart        Feet shoulder width apart, keeping eyes on target on wall __6__ feet away, tilt head down 15-30° and move head side to side for __60__ seconds. Repeat while moving head up and down for __60__ seconds.  Do _3-5___ sessions per day.      Copyright © [employer]. All rights reserved.        Patient Education   Backward        In hallway or next to a wall-Walk backwards with eyes open for 10 steps. Take even steps, making sure each foot lifts off floor.  Repeat for _4___ times per session. Do _1-2___ sessions per day.      Copyright © [employer]. All rights reserved.        Patient Education   Walking        In hallway with hand near a wall and closed eyes. Walk on solid surface with hand close to support. Attempt to keep hand away from support for longer periods of time, keeping a straight path.  Repeat __4__ times per session. Do __1-2__ sessions per day.      Copyright © [employer]. All rights reserved.

## 2021-10-28 NOTE — OP PT TREATMENT LOG
VESTIBULAR PT FLOWSHEET    P.T. TREATMENT LOG   Precautions:mild phono/photosensitivity   Eval Date: 10/21 Progress Note: 11/21   POC expires:12/21 Insurance Limits:   Treatment Current Session Time   CANALITH  REPOSITIONING TREATMENT  (CPT 27055) Y/N Notes Not performed   CRT      NEURO RE-ED  (CPT 72140) Y/N Notes 23-37 Minutes   BPPV ASSESSMENT  (-) for BPPV in all canals  Possible R hypofunction   VOR CANCELLATION                      Standing H/VVOR-C     Ambulation w/ H/VVOR-C     VOR / GAZE STABILITY     Standing H/VVOR y 1 inch B performed standing FA:  HVOR: SSV x 1 minute  VVOR: SSV x 1 minute   Ambulation w/H/VVOR y Holding 1 inch B on reacher with:  - HVOR 30 feet X 2- slow pace  - VVOR 30 feet X 2- slow pace   H/VVOR on compliant surfaces     Functional VOR     HABITUATION     Ball circles     Repetitive functional movements     BALANCE- STATIC     On floor     Airex foam     Rockerboard     BALANCE- DYNAMIC     Ambulation-head turns/nods     Ambulation - 180 & 360 degree turns     Retro ambulation EO y Gait with EO and retro walk with hand near wall X 10 steps   Ambulation with EC y Gait with EC X 10 steps with hand near wall   Obstacles     Tandem     C/S KINESTHESIA/ COORDINATION     MULTITASKING     OKS     *Objective Measures  y 10-28-21: FGA=22/30  TBA: BCTT, DVA/GST   THER-EX   (CPT 13179) Y/N SETS REPS LOAD 8-22 Minutes   STRETCHING        Chin tucks (supine, seated)        Upper Trapezius Stretch        Levator Scapula stretch        Scalene/SCM stretch        Doorway Pectoralis stretch         Open Book/ thread the needle thoracic rotation stretches                ROM        SNAGS for rotation        SNAGS for extension        Supine snow angels        Seated thoracic extension                STRENGTHENING        Cervical Stabilization        Cervical isometrics (c/s rot, flex, ext, SB, capital flex)        Resisted chin tucks w/ TB        Cervical retractions prone on elbows        Deep  cervical flexor strengthening                Scapular Stabilization        Seated scap retraction/depression        No money (B/L UE ER w/ TB)        TB rows/overhead rows        TB shoulder extension         TB shoulder horiz ABD        Standing chin tuck with shoulder flexion/abduction        W to Y's (supine or standing)        Prone T, Y, I         Modified supermans                CARDIOVASCULAR     UBE     Recumbent bike     Treadmill/BCTT y TM at 2.2 mph X 10 minutes- no head  motion and no UE support RPE=6   MANUAL  (CPT 44505) Y/N  Not performed   STM/MFR/TPR     IASTM     Joint Mobilizations           E-STIM UNATTENDED  (CPT ) Y/N  Not performed   TENS/ H-wave      HOT / COLD PACKS  (CPT 78530) Y/N  Not performed   Heat     Ice     THER ACT  (CPT 76306) Y/N  8-22 Minutes   Review pain, meds, falls, vitals, symptoms yes    Cervical Assessment yes Transverse and Alar ligaments (-) for laxity  Cervical ROM WNL all planes  Mod-sev muscular tightness right UT   *Subjective Measures     Rivermead: 41  ABC: 90%  DHI: 34%   Patient Education/HEP yes 10-28-21: Issued VOR with 1 inch B at SSV in standing with FA to be done 5x/day. Walking with EC, retro walk, walking program.

## 2021-10-31 NOTE — PROGRESS NOTES
OT DAILY NOTE FOR OUTPATIENT THERAPY    Patient: Trini Valenzuela   MRN: [id number]  : 1995 26 y.o.  Referring Physician: Dipesh Roth DO  Date of Visit: 10/28/2021      Certification Dates: 10/21/21 through 22    Diagnosis:   1. Concussion without loss of consciousness, initial encounter        Chief Complaints:   PPCS    Precautions:  Existing Precautions/Restrictions: no known precautions/restrictions    TODAY'S VISIT     History/Vitals/Pain/Encounter Info - 10/28/21 1405        Injury History/Precautions/Daily Required Info    Document Type daily treatment     Primary Therapist Itzel Zapata PT DPT     Chief Complaint/Reason for Visit  PPCS     Onset of Illness/Injury or Date of Surgery 21     Referring Physician Dr. Roth     Existing Precautions/Restrictions no known precautions/restrictions     Pertinent History of Current Functional Problem Per Dr. Roth: She is a 26-year-old female who sustained a concussion on 2021 as result of falling backwards out of a high back chair in a restaurant. She struck her head on the floor. She not experience any loss of consciousness, seizure activity, or posttraumatic amnesia. She not present to the ER. No brain imaging has been obtained. She followed up with her primary care physician on 2021. She was diagnosed with a concussion and advised on relative cognitive and physical rest. A non-contrast head CT was scheduled for 2021. She took the first week following her injury off from work then attempted to return to work full-time but was unable to do so due to increased symptoms. She resume working half days on 2021. Currently her symptoms include headaches which she describes as located over the vertex, moderate to severe in severity, pressure-like in quality, constant, and not significantly altered with Tylenol. She is endorsing dizziness which she describes more of a lightheadedness and fogginess; she denies any vertigo,  "loss of balance, falls. She does admit to visual changes with blurred vision and photophobia; she denies any diplopia. She does wear reading glasses at baseline and her last eye exam was in December 2020. She is experiencing phonophobia, cognitive slowing and fogging, impaired concentration memory, excessive fatigue, and mood instability with feelings of increased irritability and anxiety as well as insomnia with difficulty with sleep induction and maintenance. Her symptom severity score as rated on scat 5 was 51. Her reading tolerance is 30 to 60 minutes in duration. She is working part-time albeit with increased symptoms as noted above. She has resumed driving. There has been no head injury subsequent to that occurred on 9/2/2021     Patient/Family/Caregiver Comments/Observations as reported by PT in prior session \"Mild headache today at 3/10. No baseline dizziness. Going to the gym, doing light weights and riding the stationary bike\"     Start Time 1405     Stop Time 1500     Time Calculation (min) 55 min     Patient reported fall since last visit No        Pain Assessment    Currently in pain Yes     Preferred Pain Scale number (Numeric Rating Pain Scale)     Pain: Body location Head     Pain Rating (0-10): Pre Activity 3     Pain Rating (0-10): Activity 4     Pain Rating (0-10): Post Activity 4   headache       Pain Interventions    Intervention  monitored throughout - rest breaks as needed     Post Intervention Comments mild increase in existing symptoms        Pre Activity Vital Signs    Oxygen Therapy --                Daily Treatment Assessment and Plan - 10/28/21 1500        Daily Treatment Assessment and Plan    Progress toward goals Progressing     Daily Outcome Summary Patient provided with initial home exercises with handouts and engaged in practice of these exercises. She demonstrated good understanding and tolerance of them and expressed motivation for recovery and return to a normal daily routine. " "She was advised regarding pushing too hard which she admits too since her concussion.     Plan and Recommendations Continue OT plan of care supporting neurological recovery of functional vision system skills and tolerances for return to a normal work and daily routine with absent or manageable post concussion symptoms.                     OBJECTIVE DATA TAKEN TODAY      None Taken    Today's Treatment:    VISION/CONCUSSION OT FLOW SHEET    OT Vision/mTBI  EXERCISES CURRENT SESSION TIME   NEURO RE-ED  CPT 09031 TOTAL TIME FOR SESSION 23-37 Minutes   Initial Evaluation Initial evaluation completed 10/21/21.  See OT note for findings.    Dynavision     VTS3/VTS4     CPT     BITs     NVR     Saccadic Fixation 10/28/21  Practiced level 1 horizontal and vertical saccades     Ocular Motor Control     Visual Tracing 10/28/21  Practiced level 1 tracing    3D Tracking 10/28/21  Practiced seated burst convergence and reaction timing using a beach ball toss/catch activity    Visual Perception     Convergence/Divergence 10/28/21  Practiced \"Pencil Push Ups\"    Accommodation     Visual Stimulation     THER ACT  CPT 61978 TOTAL TIME FOR SESSION 23-37 Minutes   Pain, Vitals, Meds, Etc.     HEP 10/28/21  Provided the following home exercises:  1. Level 1 horizontal saccades  2. Level 2 vertical saccades  3. Level 1 tracing  4. Pencil Push Ups  5. Seated tracking/reaction timing beach ball toss/catch    Visual Endurance      Work/School Simulation      SELF CARE  CPT 09219 TOTAL TIME FOR SESSION 0-7 Minutes   PCS Symptom Management/Education 10/28/21  Reviewed energy and symptom management techniques and strategies. Patient demonstrated good verbal understanding of the information and states they are trying to apply this approach in their daily lives.    educated pt on 1-10 PCS symptom scale.  Pt demonstrates good understanding of education.    ADL/IADLs     GROUP  CPT 56025 TOTAL TIME FOR SESSION Not performed      "

## 2021-10-31 NOTE — OP OT TREATMENT LOG
"VISION/CONCUSSION OT FLOW SHEET    OT Vision/mTBI  EXERCISES CURRENT SESSION TIME   NEURO RE-ED  CPT 24797 TOTAL TIME FOR SESSION 23-37 Minutes   Initial Evaluation Initial evaluation completed 10/21/21.  See OT note for findings.    Dynavision     VTS3/VTS4     CPT     BITs     NVR     Saccadic Fixation 10/28/21  Practiced level 1 horizontal and vertical saccades     Ocular Motor Control     Visual Tracing 10/28/21  Practiced level 1 tracing    3D Tracking 10/28/21  Practiced seated burst convergence and reaction timing using a beach ball toss/catch activity    Visual Perception     Convergence/Divergence 10/28/21  Practiced \"Pencil Push Ups\"    Accommodation     Visual Stimulation     THER ACT  CPT 05010 TOTAL TIME FOR SESSION 23-37 Minutes   Pain, Vitals, Meds, Etc.     HEP 10/28/21  Provided the following home exercises:  1. Level 1 horizontal saccades  2. Level 2 vertical saccades  3. Level 1 tracing  4. Pencil Push Ups  5. Seated tracking/reaction timing beach ball toss/catch    Visual Endurance      Work/School Simulation      SELF CARE  CPT 00113 TOTAL TIME FOR SESSION 0-7 Minutes   PCS Symptom Management/Education 10/28/21  Reviewed energy and symptom management techniques and strategies. Patient demonstrated good verbal understanding of the information and states they are trying to apply this approach in their daily lives.    educated pt on 1-10 PCS symptom scale.  Pt demonstrates good understanding of education.    ADL/IADLs     GROUP  CPT 61021 TOTAL TIME FOR SESSION Not performed                                                                                  "

## 2021-11-02 ENCOUNTER — HOSPITAL ENCOUNTER (OUTPATIENT)
Dept: OCCUPATIONAL THERAPY | Age: 26
Setting detail: THERAPIES SERIES
Discharge: HOME | End: 2021-11-02
Attending: PHYSICAL MEDICINE & REHABILITATION
Payer: COMMERCIAL

## 2021-11-02 ENCOUNTER — HOSPITAL ENCOUNTER (OUTPATIENT)
Dept: PHYSICAL THERAPY | Age: 26
Setting detail: THERAPIES SERIES
Discharge: HOME | End: 2021-11-02
Attending: PHYSICAL MEDICINE & REHABILITATION
Payer: COMMERCIAL

## 2021-11-02 DIAGNOSIS — S06.0X0A CONCUSSION WITHOUT LOSS OF CONSCIOUSNESS, INITIAL ENCOUNTER: Primary | ICD-10-CM

## 2021-11-02 PROCEDURE — 97112 NEUROMUSCULAR REEDUCATION: CPT | Mod: GO

## 2021-11-02 PROCEDURE — 97110 THERAPEUTIC EXERCISES: CPT | Mod: GP

## 2021-11-02 PROCEDURE — 97010 HOT OR COLD PACKS THERAPY: CPT | Mod: GP

## 2021-11-02 PROCEDURE — 97112 NEUROMUSCULAR REEDUCATION: CPT | Mod: GP

## 2021-11-02 PROCEDURE — 97140 MANUAL THERAPY 1/> REGIONS: CPT | Mod: GP

## 2021-11-02 PROCEDURE — 97530 THERAPEUTIC ACTIVITIES: CPT | Mod: GO

## 2021-11-02 NOTE — PATIENT INSTRUCTIONS
Patient Education   Gaze Stabilization: Tip Card    1.Target must remain in focus, not blurry, and appear stationary while head is in motion.  2.Perform exercises with small head movements (45° to either side of midline).  3.Increase speed of head motion so long as target is in focus.  4.If you wear eyeglasses, be sure you can see target through lens (therapist will give specific instructions for bifocal / progressive lenses).  5.These exercises may provoke dizziness or nausea. Work through these symptoms. If too dizzy, slow head movement slightly. Rest between each exercise.  6.Exercises demand concentration; avoid distractions.  7.For safety, perform standing exercises close to a counter, wall, corner, or next to someone.    Copyright © [employer]. All rights reserved.        Patient Education   Gaze Stabilization: Standing Feet Apart        Feet shoulder width apart, keeping eyes on target on wall _6___ feet away, tilt head down 15-30° and move head side to side for _60___ seconds. Repeat while moving head up and down for _60___ seconds.  Do _3-5___ sessions per day. Set metronome to 60 bpm  Metrotimer iron    Copyright © [employer]. All rights reserved.

## 2021-11-02 NOTE — OP OT TREATMENT LOG
"VISION/CONCUSSION OT FLOW SHEET    OT Vision/mTBI  EXERCISES CURRENT SESSION TIME   NEURO RE-ED  CPT 06357 TOTAL TIME FOR SESSION 23-37 Minutes   Initial Evaluation Initial evaluation completed 10/21/21.  See OT note for findings.    Dynavision     VTS3/VTS4     CPT     BITs     NVR     Saccadic Fixation 11/02/21  Practiced the Maze packet with vision controlled line drawing down the middle with splitting each one without hitting any other lines.  3/10 eye strain provoked      10/28/21  Practiced level 1 horizontal and vertical saccades       Ocular Motor Control     Visual Tracing 10/28/21  Practiced level 1 tracing    3D Tracking 11/02/21  Practiced seated burst convergence and reaction timing using a small green bounce ball - catch and bounce - 3/10 H/A provoked and more \"spacey\"  Provided the 4 types of convergence as follows and practiced:  1) Static  2) Continuous  3) Burst  4) Jump    Visual Perception     Convergence/Divergence   10/28/21  Practiced \"Pencil Push Ups\"    Accommodation     Visual Stimulation     THER ACT  CPT 11136 TOTAL TIME FOR SESSION 23-37 Minutes   Pain, Vitals, Meds, Etc.     HEP 10/28/21  Provided the following home exercises:  1. Level 1 horizontal saccades  2. Level 2 vertical saccades  3. Level 1 tracing  4. Pencil Push Ups  5. Seated tracking/reaction timing beach ball toss/catch  11/02/21  6. 4 types of convergence    Visual Endurance      Work/School Simulation      SELF CARE  CPT 91075 TOTAL TIME FOR SESSION 0-7 Minutes   PCS Symptom Management/Education 10/28/21  Reviewed energy and symptom management techniques and strategies. Patient demonstrated good verbal understanding of the information and states they are trying to apply this approach in their daily lives.    educated pt on 1-10 PCS symptom scale.  Pt demonstrates good understanding of education.    ADL/IADLs     GROUP  CPT 73213 TOTAL TIME FOR SESSION Not performed      "

## 2021-11-02 NOTE — OP PT TREATMENT LOG
"VESTIBULAR PT FLOWSHEET    P.T. TREATMENT LOG   Precautions:mild phono/photosensitivity   Eval Date: 10/21/21 Progress Note: 11/21   POC expires:12/21 Insurance Limits:   Treatment Current Session Time   CANALITH  REPOSITIONING TREATMENT  (CPT 13834) Y/N Notes Not performed   CRT      NEURO RE-ED  (CPT 19662) Y/N Notes 8-22 Minutes   BPPV ASSESSMENT  (-) for BPPV in all canals  Possible R hypofunction   VOR CANCELLATION                      Standing H/VVOR-C     Ambulation w/ H/VVOR-C     VOR / GAZE STABILITY     Standing H/VVOR y 1 inch B performed standing FA at 5 feet distance:  HVOR: 60 bpm x 1 minute- no dizziness  VVOR: 60 bpm x 1 minute- no dizziness  * Trialed higher speeds but target moving   Ambulation w/H/VVOR y Holding 1 inch B in hand with:  - HVOR 30 feet X 2- slow pace  - VVOR 30 feet X 2- slow pace   H/VVOR on compliant surfaces     Functional VOR     HABITUATION     Ball circles     Repetitive functional movements     BALANCE- STATIC     On floor     Airex foam     Rockerboard     BALANCE- DYNAMIC     Ambulation-head turns/nods     Ambulation - 180 & 360 degree turns     Retro ambulation EO  Gait with EO and retro walk with hand near wall X 10 steps   Ambulation with EC  Gait with EC X 10 steps with hand near wall   Obstacles     Tandem     C/S KINESTHESIA/ COORDINATION     MULTITASKING     OKS     *Objective Measures    nv 10-28-21: FGA=22/30  TBA: BCTT, DVA/GST   THER-EX   (CPT 09332) Y/N SETS REPS LOAD 8-22 Minutes   STRETCHING        Chin tucks with retraction with towel under occiput (supine, seated) y 1 10 10\"    Upper Trapezius Stretch        Levator Scapula stretch        Scalene/SCM stretch        Doorway Pectoralis stretch         Open Book/ thread the needle thoracic rotation stretches        Chin tuck with ret with rotation y 1 3     ROM        SNAGS for rotation        SNAGS for extension        Supine snow angels        Seated thoracic extension                STRENGTHENING      " "  Cervical Stabilization        Cervical isometrics (c/s rot, flex, ext, SB, capital flex)        Resisted chin tucks w/ TB        Cervical retractions prone on elbows        Deep cervical flexor strengthening                Scapular Stabilization        Seated scap retraction/depression        No money (B/L UE ER w/ TB)        TB rows/overhead rows        TB shoulder extension         TB shoulder horiz ABD        Standing chin tuck with shoulder flexion/abduction        W to Y's (supine or standing)        Prone T, Y, I         Modified supermans                CARDIOVASCULAR     UBE     Recumbent bike     Treadmill/BCTT  TM at 2.2 mph X 10 minutes- no head  motion and no UE support RPE=6   MANUAL  (CPT 40656) Y/N  8-22 Minutes   STM/MFR/TPR y Trigger point release right upper trap   IASTM nv    Joint Mobilizations y Grade 2 C7- C5   SOR/ cervical distraction y X 5 minutes each    E-STIM UNATTENDED  (CPT ) Y/N  Not performed   TENS/ H-wave      HOT / COLD PACKS  (CPT 82404) Y/N  8-22 Minutes   Heat y Cervical spine X 10 minutes in supine   Ice     THER ACT  (CPT 44457) Y/N  8-22 Minutes   Review pain, meds, falls, vitals, symptoms yes    Cervical Assessment yes Transverse and Alar ligaments (-) for laxity  Cervical ROM = see ROM section  Palpation   *Subjective Measures     R[place]: 41  ABC: 90%  DHI: 34%   Patient Education/HEP yes 10-28-21: Issued VOR with 1 inch B at SSV in standing with FA to be done 5x/day. Walking with EC, retro walk, walking program.  11-2- 21: Issued VOR with metronome at 60 bpm and instructions to download metronome on phone \"Metrotimer\". Issued supine chin tuck with cervical retraction and cervical retraction with rotation. Instructed in proper supine to sit transfer to decrease stress on C- spine. Instructed to slightly retract scapula to avoid anterior position of shoulders.     "

## 2021-11-03 NOTE — PROGRESS NOTES
PT DAILY NOTE FOR OUTPATIENT THERAPY    Patient: Trini Valenzuela   MRN: [id number]  : 1995 26 y.o.  Referring Physician: Dipesh Roth DO  Date of Visit: 2021    Certification Dates: 10/21/21 through 21    Diagnosis:   1. Concussion without loss of consciousness, initial encounter        Chief Complaints:  PPCS    Precautions:   Existing Precautions/Restrictions: no known precautions/restrictions     TODAY'S VISIT     History/Vitals/Pain/Encounter Info - 21 1306        Injury History/Precautions/Daily Required Info    Primary Therapist Itzel Zapata PT DPT     Chief Complaint/Reason for Visit  PPCS     Onset of Illness/Injury or Date of Surgery 21     Referring Physician Dr. Roth     Existing Precautions/Restrictions no known precautions/restrictions     Pertinent History of Current Functional Problem Per Dr. Roth: She is a 26-year-old female who sustained a concussion on 2021 as result of falling backwards out of a high back chair in a restaurant. She struck her head on the floor. She not experience any loss of consciousness, seizure activity, or posttraumatic amnesia. She not present to the ER. No brain imaging has been obtained. She followed up with her primary care physician on 2021. She was diagnosed with a concussion and advised on relative cognitive and physical rest. A non-contrast head CT was scheduled for 2021. She took the first week following her injury off from work then attempted to return to work full-time but was unable to do so due to increased symptoms. She resume working half days on 2021. Currently her symptoms include headaches which she describes as located over the vertex, moderate to severe in severity, pressure-like in quality, constant, and not significantly altered with Tylenol. She is endorsing dizziness which she describes more of a lightheadedness and fogginess; she denies any vertigo, loss of balance, falls. She does admit  to visual changes with blurred vision and photophobia; she denies any diplopia. She does wear reading glasses at baseline and her last eye exam was in December 2020. She is experiencing phonophobia, cognitive slowing and fogging, impaired concentration memory, excessive fatigue, and mood instability with feelings of increased irritability and anxiety as well as insomnia with difficulty with sleep induction and maintenance. Her symptom severity score as rated on scat 5 was 51. Her reading tolerance is 30 to 60 minutes in duration. She is working part-time albeit with increased symptoms as noted above. She has resumed driving. There has been no head injury subsequent to that occurred on 9/2/2021     OP Specialty Concussion     Document Type daily treatment     Patient/Family/Caregiver Comments/Observations Trini reports that she went to the gym ths morning and has a 3/10 headache on arrival. She reports that typically this will only last about 30 minutes. She stops her exercise after 30 minutes because of headache and is advised today to stop 5 minutes earlier before she develops the headache.     Start Time 1305     Stop Time 1400     Time Calculation (min) 55 min     Patient reported fall since last visit No        Pain Assessment    Currently in pain Yes     Preferred Pain Scale number (Numeric Rating Pain Scale)     Pain Side/Orientation upper     Pain: Body location Head     Pain Rating (0-10): Pre Activity 3     Pain Rating (0-10): Post Activity 1        Pain Intervention    Intervention  monitor, manual, heat, TE     Post Intervention Comments decreased        Pre Activity Vital Signs    Pulse 84     /62     BP Location Left upper arm     BP Method Automatic     Patient Position Sitting                Daily Treatment Assessment and Plan - 11/02/21 1306        Daily Treatment Assessment and Plan    Progress toward goals Progressing     Daily Outcome Summary Rukhsana arrived to PT today after going to the  gym prior to therapy with a headache at 3/10. She was advised to reduce her workout time to decrease incidence of headache. Session started with VOR with advancement to metronome at 60 bpm.  Mild dyskinesia noted of cervical motions with VOR and JPE to be tested at next session. Remainder of session focused on cervical treatment. Rukhsana with decreased cervical ROM today. She responded well to intervention with headache decreased 2 levels at the end of the session.     Plan and Recommendations JPE, increase VOR speed, walking VOR, functional VOR with balls, continue cervical intervention                     OBJECTIVE DATA TAKEN TODAY:    ROM    Range of Motion - 11/02/21 1300        Cervical AROM    Cervical Flexion 48   pain C7    Cervical Extension 35     Cervical Left SB --   WNL    Cervical Right SB --   WNL    Cervical Left Rotation 62   Pain right C/S    Cervical Right Rotation 74   Pain right C/S                Today's Treatment:    VESTIBULAR PT FLOWSHEET    P.T. TREATMENT LOG   Precautions:mild phono/photosensitivity   Eval Date: 10/21/21 Progress Note: 11/21   POC expires:12/21 Insurance Limits:   Treatment Current Session Time   CANALITH  REPOSITIONING TREATMENT  (CPT 51627) Y/N Notes Not performed   CRT      NEURO RE-ED  (CPT 53723) Y/N Notes 8-22 Minutes   BPPV ASSESSMENT  (-) for BPPV in all canals  Possible R hypofunction   VOR CANCELLATION                      Standing H/VVOR-C     Ambulation w/ H/VVOR-C     VOR / GAZE STABILITY     Standing H/VVOR y 1 inch B performed standing FA at 5 feet distance:  HVOR: 60 bpm x 1 minute- no dizziness  VVOR: 60 bpm x 1 minute- no dizziness  * Trialed higher speeds but target moving   Ambulation w/H/VVOR y Holding 1 inch B in hand with:  - HVOR 30 feet X 2- slow pace  - VVOR 30 feet X 2- slow pace   H/VVOR on compliant surfaces     Functional VOR     HABITUATION     Ball circles     Repetitive functional movements     BALANCE- STATIC     On floor     Airex foam    "  Rockerboard     BALANCE- DYNAMIC     Ambulation-head turns/nods     Ambulation - 180 & 360 degree turns     Retro ambulation EO  Gait with EO and retro walk with hand near wall X 10 steps   Ambulation with EC  Gait with EC X 10 steps with hand near wall   Obstacles     Tandem     C/S KINESTHESIA/ COORDINATION     MULTITASKING     OKS     *Objective Measures    nv 10-28-21: FGA=22/30  TBA: BCTT, DVA/GST   THER-EX   (CPT 74494) Y/N SETS REPS LOAD 8-22 Minutes   STRETCHING        Chin tucks with retraction with towel under occiput (supine, seated) y 1 10 10\"    Upper Trapezius Stretch        Levator Scapula stretch        Scalene/SCM stretch        Doorway Pectoralis stretch         Open Book/ thread the needle thoracic rotation stretches        Chin tuck with ret with rotation y 1 3     ROM        SNAGS for rotation        SNAGS for extension        Supine snow angels        Seated thoracic extension                STRENGTHENING        Cervical Stabilization        Cervical isometrics (c/s rot, flex, ext, SB, capital flex)        Resisted chin tucks w/ TB        Cervical retractions prone on elbows        Deep cervical flexor strengthening                Scapular Stabilization        Seated scap retraction/depression        No money (B/L UE ER w/ TB)        TB rows/overhead rows        TB shoulder extension         TB shoulder horiz ABD        Standing chin tuck with shoulder flexion/abduction        W to Y's (supine or standing)        Prone T, Y, I         Modified [facility]                CARDIOVASCULAR     UBE     Recumbent bike     Treadmill/BCTT  TM at 2.2 mph X 10 minutes- no head  motion and no UE support RPE=6   MANUAL  (CPT 32678) Y/N  8-22 Minutes   STM/MFR/TPR y Trigger point release right upper trap   IASTM nv    Joint Mobilizations y Grade 2 C7- C5   SOR/ cervical distraction y X 5 minutes each    E-STIM UNATTENDED  (CPT ) Y/N  Not performed   TENS/ H-wave      HOT / COLD PACKS  (CPT 96010) Y/N  " "8-22 Minutes   Heat y Cervical spine X 10 minutes in supine   Ice     THER ACT  (CPT 67532) Y/N  8-22 Minutes   Review pain, meds, falls, vitals, symptoms yes    Cervical Assessment yes Transverse and Alar ligaments (-) for laxity  Cervical ROM = see ROM section  Palpation   *Subjective Measures     [place]: 41  ABC: 90%  DHI: 34%   Patient Education/HEP yes 10-28-21: Issued VOR with 1 inch B at SSV in standing with FA to be done 5x/day. Walking with EC, retro walk, walking program.  11-2- 21: Issued VOR with metronome at 60 bpm and instructions to download metronome on phone \"Metrotimer\". Issued supine chin tuck with cervical retraction and cervical retraction with rotation. Instructed in proper supine to sit transfer to decrease stress on C- spine. Instructed to slightly retract scapula to avoid anterior position of shoulders.                        "

## 2021-11-04 NOTE — PROGRESS NOTES
OT DAILY NOTE FOR OUTPATIENT THERAPY    Patient: Trini Valenzuela   MRN: [id number]  : 1995 26 y.o.  Referring Physician: Dipesh Roth DO  Date of Visit: 2021      Certification Dates: 10/21/21 through 22    Diagnosis:   1. Concussion without loss of consciousness, initial encounter        Chief Complaints:   PPCS    Precautions:  Existing Precautions/Restrictions: no known precautions/restrictions    TODAY'S VISIT     History/Vitals/Pain/Encounter Info - 21 1425        Injury History/Precautions/Daily Required Info    Document Type daily treatment     Primary Therapist Jonah Olivas OTR/L     Chief Complaint/Reason for Visit  PPCS     Onset of Illness/Injury or Date of Surgery 21     Referring Physician Dr. Roth     Existing Precautions/Restrictions no known precautions/restrictions     Pertinent History of Current Functional Problem Per Dr. Roth: She is a 26-year-old female who sustained a concussion on 2021 as result of falling backwards out of a high back chair in a restaurant. She struck her head on the floor. She not experience any loss of consciousness, seizure activity, or posttraumatic amnesia. She not present to the ER. No brain imaging has been obtained. She followed up with her primary care physician on 2021. She was diagnosed with a concussion and advised on relative cognitive and physical rest. A non-contrast head CT was scheduled for 2021. She took the first week following her injury off from work then attempted to return to work full-time but was unable to do so due to increased symptoms. She resume working half days on 2021. Currently her symptoms include headaches which she describes as located over the vertex, moderate to severe in severity, pressure-like in quality, constant, and not significantly altered with Tylenol. She is endorsing dizziness which she describes more of a lightheadedness and fogginess; she denies any vertigo,  loss of balance, falls. She does admit to visual changes with blurred vision and photophobia; she denies any diplopia. She does wear reading glasses at baseline and her last eye exam was in December 2020. She is experiencing phonophobia, cognitive slowing and fogging, impaired concentration memory, excessive fatigue, and mood instability with feelings of increased irritability and anxiety as well as insomnia with difficulty with sleep induction and maintenance. Her symptom severity score as rated on scat 5 was 51. Her reading tolerance is 30 to 60 minutes in duration. She is working part-time albeit with increased symptoms as noted above. She has resumed driving. There has been no head injury subsequent to that occurred on 9/2/2021     Patient/Family/Caregiver Comments/Observations Trini reports that she went to the gym ths morning and has a 3/10 headache on arrival. She reports that typically this will only last about 30 minutes. She stops after 30 minutes because of headache and is advised today to stop 5 minutes earlier before she develops the headache.     Start Time 1405     Stop Time 1500     Time Calculation (min) 55 min     Patient reported fall since last visit No        Pain Assessment    Currently in pain Yes     Preferred Pain Scale number (Numeric Rating Pain Scale)     Pain: Body location Head     Pain Rating (0-10): Pre Activity 2   and 3/10 eye strain    Pain Rating (0-10): Activity 3     Pain Rating (0-10): Post Activity 3        Pain Interventions    Intervention  monitored - rest breaks as needed     Post Intervention Comments mild increase in symptoms                Daily Treatment Assessment and Plan - 11/02/21 1500        Daily Treatment Assessment and Plan    Progress toward goals Progressing     Daily Outcome Summary Patient provided with complete convergence exercises and engaged in practice of these exercises. She demonstrated good understanding and tolerance of them and expressed  "motivation for recovery and return to a normal daily routine. She was advised regarding pushing too hard which she admits too since her concussion.     Plan and Recommendations Continue OT plan of care supporting neurological recovery of functional vision system skills and tolerances for return to a normal work and daily routine with absent or manageable post concussion symptoms.                     OBJECTIVE DATA TAKEN TODAY    None Taken        Today's Treatment:    VISION/CONCUSSION OT FLOW SHEET    OT Vision/mTBI  EXERCISES CURRENT SESSION TIME   NEURO RE-ED  CPT 43693 TOTAL TIME FOR SESSION 23-37 Minutes   Initial Evaluation Initial evaluation completed 10/21/21.  See OT note for findings.    Dynavision     VTS3/VTS4     CPT     BITs     NVR     Saccadic Fixation 11/02/21  Practiced the Maze packet with vision controlled line drawing down the middle with splitting each one without hitting any other lines.  3/10 eye strain provoked      10/28/21  Practiced level 1 horizontal and vertical saccades       Ocular Motor Control     Visual Tracing 10/28/21  Practiced level 1 tracing    3D Tracking 11/02/21  Practiced seated burst convergence and reaction timing using a small green bounce ball - catch and bounce - 3/10 H/A provoked and more \"spacey\"  Provided the 4 types of convergence as follows and practiced:  1) Static  2) Continuous  3) Burst  4) Jump    Visual Perception     Convergence/Divergence   10/28/21  Practiced \"Pencil Push Ups\"    Accommodation     Visual Stimulation     THER ACT  CPT 67012 TOTAL TIME FOR SESSION 23-37 Minutes   Pain, Vitals, Meds, Etc.     HEP 10/28/21  Provided the following home exercises:  1. Level 1 horizontal saccades  2. Level 2 vertical saccades  3. Level 1 tracing  4. Pencil Push Ups  5. Seated tracking/reaction timing beach ball toss/catch  11/02/21  6. 4 types of convergence    Visual Endurance      Work/School Simulation      SELF CARE  CPT 86156 TOTAL TIME FOR SESSION 0-7 " Minutes   PCS Symptom Management/Education 10/28/21  Reviewed energy and symptom management techniques and strategies. Patient demonstrated good verbal understanding of the information and states they are trying to apply this approach in their daily lives.    educated pt on 1-10 PCS symptom scale.  Pt demonstrates good understanding of education.    ADL/IADLs     GROUP  CPT 44629 TOTAL TIME FOR SESSION Not performed

## 2021-11-12 ENCOUNTER — TELEPHONE (OUTPATIENT)
Dept: REGISTRATION | Facility: CLINIC | Age: 26
End: 2021-11-12

## 2021-11-12 NOTE — TELEPHONE ENCOUNTER
requested call back to [facility].  please transfer if she call back.  Had to cancel OT appt today. Jonah called out and we do not have coverage.  Need to know if she will be attending PT at [facility]

## 2021-11-17 ENCOUNTER — HOSPITAL ENCOUNTER (OUTPATIENT)
Dept: PHYSICAL THERAPY | Age: 26
Setting detail: THERAPIES SERIES
Discharge: HOME | End: 2021-11-17
Attending: PHYSICAL MEDICINE & REHABILITATION
Payer: COMMERCIAL

## 2021-11-17 ENCOUNTER — HOSPITAL ENCOUNTER (OUTPATIENT)
Dept: OCCUPATIONAL THERAPY | Age: 26
Setting detail: THERAPIES SERIES
Discharge: HOME | End: 2021-11-17
Attending: PHYSICAL MEDICINE & REHABILITATION
Payer: COMMERCIAL

## 2021-11-17 DIAGNOSIS — S06.0X0A CONCUSSION WITHOUT LOSS OF CONSCIOUSNESS, INITIAL ENCOUNTER: Primary | ICD-10-CM

## 2021-11-17 PROCEDURE — 97112 NEUROMUSCULAR REEDUCATION: CPT | Mod: GO

## 2021-11-17 PROCEDURE — 97110 THERAPEUTIC EXERCISES: CPT | Mod: GP

## 2021-11-17 PROCEDURE — 97535 SELF CARE MNGMENT TRAINING: CPT | Mod: GO

## 2021-11-17 PROCEDURE — 97112 NEUROMUSCULAR REEDUCATION: CPT | Mod: GP

## 2021-11-17 PROCEDURE — 97530 THERAPEUTIC ACTIVITIES: CPT | Mod: GP

## 2021-11-17 NOTE — OP PT TREATMENT LOG
"VESTIBULAR PT FLOWSHEET               P.T. TREATMENT LOG   Precautions:mild phono/photosensitivity   Eval Date: 10/21/21 Progress Note: 11/21   POC expires:12/21 Insurance Limits:   Treatment Current Session Time   CANALITH  REPOSITIONING TREATMENT  (CPT 44673) Y/N Notes Not performed   CRT         NEURO RE-ED  (CPT 47708) Y/N Notes 23-37  Minutes   BPPV ASSESSMENT   (-) for BPPV in all canals  Possible R hypofunction   VOR CANCELLATION                       Standing H/VVOR-C       Ambulation w/ H/VVOR-C       VOR / GAZE STABILITY       Standing H/VVOR y 1 inch B performed standing FA at 5 feet distance:  checkerboard  HVOR: 140 bpm x 1 minute- no dizziness  VVOR: 140 bpm x 1 minute- no dizziness     Ambulation w/H/VVOR  Holding 1 inch B in hand with:  - HVOR 30 feet X 2- slow pace  - VVOR 30 feet X 2- slow pace   H/VVOR on compliant surfaces       Functional VOR       HABITUATION       Ball circles       Repetitive functional movements       BALANCE- STATIC       On floor  y    y  see flowsheet    SOT  wnl   Airex foam       Rockerboard       BALANCE- DYNAMIC       Ambulation-head turns/nods       Ambulation - 180 & 360 degree turns       Retro ambulation EO   Gait with EO and retro walk with hand near wall X 10 steps   Ambulation with EC   Gait with EC X 10 steps with hand near wall   Obstacles       Tandem       C/S KINESTHESIA/ COORDINATION       MULTITASKING       OKS       *Objective Measures     y 10-28-21: FGA=22/30  11-17  fga  30/30  TBA: BCTT, DVA/GST   THER-EX   (CPT 29729) Y/N SETS REPS LOAD 8-22 Minutes   STRETCHING             Chin tucks with retraction with towel under occiput (supine, seated)  1 10 10\"     Upper Trapezius Stretch             Levator Scapula stretch             Scalene/SCM stretch             Doorway Pectoralis stretch              Open Book/ thread the needle thoracic rotation stretches             Chin tuck with ret with rotation  1 3       ROM             SNAGS for rotation       " "      SNAGS for extension             Supine snow angels             Seated thoracic extension                           STRENGTHENING             Cervical Stabilization             Cervical isometrics (c/s rot, flex, ext, SB, capital flex)             Resisted chin tucks w/ TB             Cervical retractions prone on elbows             Deep cervical flexor strengthening                           Scapular Stabilization             Seated scap retraction/depression             No money (B/L UE ER w/ TB)             TB rows/overhead rows             TB shoulder extension              TB shoulder horiz ABD             Standing chin tuck with shoulder flexion/abduction             W to Y's (supine or standing)             Prone T, Y, I              Modified [employer]mans                           CARDIOVASCULAR       UBE       Recumbent bike       Treadmill/BCTT   TM at 2.2 mph X 10 minutes- no head  motion and no UE support RPE=6   MANUAL  (CPT 79906) Y/N    Minutes   STM/MFR/TPR  Trigger point release right upper trap   IASTM      Joint Mobilizations  Grade 2 C7- C5   SOR/ cervical distraction  X 5 minutes each         E-STIM UNATTENDED  (CPT ) Y/N   Not performed   TENS/ H-wave         HOT / COLD PACKS  (CPT 83550) Y/N      Heat  Cervical spine X 10 minutes in supine   Ice       THER ACT  (CPT 04767) Y/N   8-22 Minutes   Review pain, meds, falls, vitals, symptoms yes     Cervical Assessment  Transverse and Alar ligaments (-) for laxity  Cervical ROM = see ROM section  Palpation   *Subjective Measures            yes [place]: 41  ABC: 90%  DHI: 34%  [place]  12   Patient Education/HEP yes 10-28-21: Issued VOR with 1 inch B at SSV in standing with FA to be done 5x/day. Walking with EC, retro walk, walking program.  11-2- 21: Issued VOR with metronome at 60 bpm and instructions to download metronome on phone \"Metrotimer\". Issued supine chin tuck with cervical retraction and cervical retraction with rotation. " Instructed in proper supine to sit transfer to decrease stress on C- spine. Instructed to slightly retract scapula to avoid anterior position of shoulders.  11-17  Reviewed return to jog program; written instructions given       [facility] Concussion Treadmill Test  Date:    11-17-21                  Baseline overall symptom level = 0  60% MHR = 115         70% MHR = 137  80% MHR = 156  90% MHR = 176    Test performed at speed 3.6 mph    Minute Incline HR RPE Symptoms Comments   0 0 98  0    1 1%       2 2% 122 11 0    3 3%       4 4% 132  0    5 5%       6 6%       7 7% 144 13     8 8%    Stopped due to leg discomfort--not pcs symptoms   9 9%       10 10%       11 11%       12 12%       13 13%       14 14%       15 15%       16 15%; speed ____       17 15%; speed ____       18 15%; speed ____       19 15%; speed ____       20 15%; speed ____         Posttest assessment: stopped test due to leg discomfort and not pcs symptoms  Test stopped at: 7 minutes due to leg discomfort  Post test vitals = 125/86;  110 bpm  Overall symptom level = 0    Motion Sensitivity Quotient   DATE:                    Baseline Level (0-5):                    MSQ Score:    POSITION INTENSITY ADJUSTED  INTENSITY DURATION SCORE   Sitting to Supine 0      Supine to Left Side Lying 0      Supine to Right Side Lying 0      Supine to Sit 0      Left Hallpike -      to  Sitting -      Right Hallpike -      to Sitting -      Sitting Nose to Left Knee 0      to Sitting 0      Sitting Nose to Right Knee 0      to Sitting 0      Sitting Head Rotation (4X) 0      Sitting Head Flex & Ext (4X) 0      Standing  360° Turn to Right 0      Standing 360° Turn to Left 0        Intensity: Scale 0-5 (0= no symptoms and 5= severe symptoms)  Adjusted Intensity: Intensity Level - Baseline Level  Duration: Scale from 0 to 3 for return to baseline (5-10”=1, 11-30”=2, >  30”=3)  Score: Adjusted Intensity + Duration  MOTION SENSITIVITY QUOTIENT % = # Provoking Positions X  Total Score   X 100                                                                                                          2048   *If < 16 positions are tested, 2048 is replaced with 8 X (# of positions tested)2

## 2021-11-17 NOTE — PATIENT INSTRUCTIONS
"  Patient Education   General Safety Tip Card        1.For safety, all exercises must be performed close to a support (wall, countertop, person, etc.) or in a corner with back of chair in front.  2.Only perform those exercises as instructed by the therapist. If instructions are not clearly understood, wait for clarification by therapist before attempting to perform.    Copyright © [facility]. All rights reserved.        Patient Education   Feet Heel-Toe \"Tandem\", Varied Arm Positions - Eyes Closed        Stand with right foot directly in front of the other and arms out. Close eyes and visualize upright position. Hold__30-60__ seconds.  Repeat __1__ times per session. Do __2__ sessions per day.    Copyright © [employer]. All rights reserved.        Patient Education   Single Leg - Eyes Closed        While standing on left leg, close eyes and maintain balance. Hold___45_ seconds.  Repeat __1__ times per session. Do ___2_ sessions per day.    STAND NEAR SUPPORT SURFACE.    Copyright © [facility]. All rights reserved.        "

## 2021-11-17 NOTE — PROGRESS NOTES
PT DISCHARGE NOTE FOR OUTPATIENT THERAPY    Patient: Trini Valenzuela   MRN: [id number]  : 1995 26 y.o.  Referring Physician: Dipesh Roth DO  Date of Visit: 2021      Certification Dates: 10/21/21 through 21    Total Visit Count: 4    Chief Complaints:  No chief complaint on file.      Precautions:  Existing Precautions/Restrictions: no known precautions/restrictions     TODAY'S VISIT:     General Information - 21 1001        Session Details    Document Type discharge evaluation     Mode of Treatment individual therapy;physical therapy     OP Specialty Concussion;Orthopedics        Time Calculation    Start Time 1000     Stop Time 1100     Time Calculation (min) 60 min        General Information    Onset of Illness/Injury or Date of Surgery 21     Referring Physician Dr. Roth     Pertinent History of Current Functional Problem Per Dr. Roth: She is a 26-year-old female who sustained a concussion on 2021 as result of falling backwards out of a high back chair in a restaurant. She struck her head on the floor. She not experience any loss of consciousness, seizure activity, or posttraumatic amnesia. She not present to the ER. No brain imaging has been obtained. She followed up with her primary care physician on 2021. She was diagnosed with a concussion and advised on relative cognitive and physical rest. A non-contrast head CT was scheduled for 2021. She took the first week following her injury off from work then attempted to return to work full-time but was unable to do so due to increased symptoms. She resume working half days on 2021. Currently her symptoms include headaches which she describes as located over the vertex, moderate to severe in severity, pressure-like in quality, constant, and not significantly altered with Tylenol. She is endorsing dizziness which she describes more of a lightheadedness and fogginess; she denies any vertigo, loss of  balance, falls. She does admit to visual changes with blurred vision and photophobia; she denies any diplopia. She does wear reading glasses at baseline and her last eye exam was in December 2020. She is experiencing phonophobia, cognitive slowing and fogging, impaired concentration memory, excessive fatigue, and mood instability with feelings of increased irritability and anxiety as well as insomnia with difficulty with sleep induction and maintenance. Her symptom severity score as rated on scat 5 was 51. Her reading tolerance is 30 to 60 minutes in duration. She is working part-time albeit with increased symptoms as noted above. She has resumed driving. There has been no head injury subsequent to that occurred on 9/2/2021     Existing Precautions/Restrictions no known precautions/restrictions                  Pain/Vitals - 11/17/21 1340        Pain Assessment    Currently in pain No/Denies        Pain Intervention    Intervention  monitored     Post Intervention Comments n/a                PT - 11/17/21 1001        Physical Therapy    Physical Therapy Vestibular        PT Plan    Frequency of treatment --   1-2x/week    PT Duration 8 weeks     PT Custom Frequency and Duration 1-2x/week     PT Cert From 10/21/21     PT Cert To 12/20/21     Date PT POC was sent to provider 10/21/21     Signed PT Plan of Care received?  Yes                Assessment and Plan - 11/17/21 1340        Assessment    Plan of Care reviewed and patient/family in agreement Yes     System Pathology/Pathophysiology Noted musculoskeletal;integumentary;neuromuscular;vestibular     Functional Limitations in Following Categories (PT Eval) home management;school;work;community/leisure     Clinical Assessment Pt retuned to PT today and reports she feels well and is ready for dc.  Final testing completed.  SOT wnl; FGA 30/30; MSQ 0%.  BCTT with normal physiological response  (75% max heart rate)--ended due to pt request because of leg discomfort, but  no post concussion symptoms.  Pt tolerated VOR ex up to 140 bpm without symptoms and reports that she does not have symptoms with functional/vor in daily living.  Static balance testing performed.  Pt scored below norms for sharpened rhomberg and sls--both vestibular conditions with eyes closed.  Pt aware of this.  Both exercises issued for HEP.  At this time, pt wishes to be discharged from PT services.  She is working full time and has returned to gym, and manages her symptoms.  Return to running program reviewed with patient.  POC, test results and all goals reviewed with pt. Pt given opportunity for any questions.   Patient discharged.     Plan and Recommendations dc with hep                 OBJECTIVE MEASUREMENTS/DATA:    Vestibular     PT Vestibular Evaluation - 11/17/21 1000        Balance Master    SOT Composite Norms WNL     SOT Composite Score 78     Somatosensory WNL   102    Visual WNL   85    Vestibular WNL   70    Visual Preference WNL   99       Balance    Rhomberg Eyes Open 60 seconds     Rhomberg Eyes Closed 60 seconds     Sharpened Rhomberg Eyes Open 60 seconds     Sharpened Rhomberg Eyes Closed 45 seconds     SLS Right Eyes Open 30 seconds     SLS Left Eyes Open 30 seconds     SLS Right Eyes Closed 20 seconds     SLS Left Eyes Closed 20 seconds     FGA Score (out of 30 total) 30        Other Outcome Measures    River[place]ad Score 12                 Today's Treatment:    VESTIBULAR PT FLOWSHEET               P.T. TREATMENT LOG   Precautions:mild phono/photosensitivity   Eval Date: 10/21/21 Progress Note: 11/21   POC expires:12/21 Insurance Limits:   Treatment Current Session Time   CANALITH  REPOSITIONING TREATMENT  (CPT 35368) Y/N Notes Not performed   CRT         NEURO RE-ED  (CPT 95939) Y/N Notes 23-37  Minutes   BPPV ASSESSMENT   (-) for BPPV in all canals  Possible R hypofunction   VOR CANCELLATION                       Standing H/VVOR-C       Ambulation w/ H/VVOR-C       VOR / GAZE STABILITY      "  Standing H/VVOR y 1 inch B performed standing FA at 5 feet distance:  checkerboard  HVOR: 140 bpm x 1 minute- no dizziness  VVOR: 140 bpm x 1 minute- no dizziness     Ambulation w/H/VVOR  Holding 1 inch B in hand with:  - HVOR 30 feet X 2- slow pace  - VVOR 30 feet X 2- slow pace   H/VVOR on compliant surfaces       Functional VOR       HABITUATION       Ball circles       Repetitive functional movements       BALANCE- STATIC       On floor  y    y  see flowsheet    SOT  wnl   Airex foam       Rockerboard       BALANCE- DYNAMIC       Ambulation-head turns/nods       Ambulation - 180 & 360 degree turns       Retro ambulation EO   Gait with EO and retro walk with hand near wall X 10 steps   Ambulation with EC   Gait with EC X 10 steps with hand near wall   Obstacles       Tandem       C/S KINESTHESIA/ COORDINATION       MULTITASKING       OKS       *Objective Measures     y 10-28-21: FGA=22/30  11-17  fga  30/30  TBA: BCTT, DVA/GST   THER-EX   (CPT 18019) Y/N SETS REPS LOAD 8-22 Minutes   STRETCHING             Chin tucks with retraction with towel under occiput (supine, seated)  1 10 10\"     Upper Trapezius Stretch             Levator Scapula stretch             Scalene/SCM stretch             Doorway Pectoralis stretch              Open Book/ thread the needle thoracic rotation stretches             Chin tuck with ret with rotation  1 3       ROM             SNAGS for rotation             SNAGS for extension             Supine snow angels             Seated thoracic extension                           STRENGTHENING             Cervical Stabilization             Cervical isometrics (c/s rot, flex, ext, SB, capital flex)             Resisted chin tucks w/ TB             Cervical retractions prone on elbows             Deep cervical flexor strengthening                           Scapular Stabilization             Seated scap retraction/depression             No money (B/L UE ER w/ TB)             TB rows/overhead " "rows             TB shoulder extension              TB shoulder horiz ABD             Standing chin tuck with shoulder flexion/abduction             W to Y's (supine or standing)             Prone T, Y, I              Modified supermans                           CARDIOVASCULAR       UBE       Recumbent bike       Treadmill/BCTT   TM at 2.2 mph X 10 minutes- no head  motion and no UE support RPE=6   MANUAL  (CPT 34841) Y/N    Minutes   STM/MFR/TPR  Trigger point release right upper trap   IASTM      Joint Mobilizations  Grade 2 C7- C5   SOR/ cervical distraction  X 5 minutes each         E-STIM UNATTENDED  (CPT ) Y/N   Not performed   TENS/ H-wave         HOT / COLD PACKS  (CPT 97195) Y/N      Heat  Cervical spine X 10 minutes in supine   Ice       THER ACT  (CPT 62964) Y/N   8-22 Minutes   Review pain, meds, falls, vitals, symptoms yes     Cervical Assessment  Transverse and Alar ligaments (-) for laxity  Cervical ROM = see ROM section  Palpation   *Subjective Measures            yes Rivermead: 41  ABC: 90%  DHI: 34%  Rivermead  12   Patient Education/HEP yes 10-28-21: Issued VOR with 1 inch B at SSV in standing with FA to be done 5x/day. Walking with EC, retro walk, walking program.  11-2- 21: Issued VOR with metronome at 60 bpm and instructions to download metronome on phone \"Metrotimer\". Issued supine chin tuck with cervical retraction and cervical retraction with rotation. Instructed in proper supine to sit transfer to decrease stress on C- spine. Instructed to slightly retract scapula to avoid anterior position of shoulders.  11-17  Reviewed return to jog program; written instructions given       [facility] Concussion Treadmill Test  Date:    11-17-21                  Baseline overall symptom level = 0  60% MHR = 115         70% MHR = 137  80% MHR = 156  90% MHR = 176    Test performed at speed 3.6 mph    Minute Incline HR RPE Symptoms Comments   0 0 98  0    1 1%       2 2% 122 11 0    3 3%       4 4% 132  0 "    5 5%       6 6%       7 7% 144 13     8 8%    Stopped due to leg discomfort--not pcs symptoms   9 9%       10 10%       11 11%       12 12%       13 13%       14 14%       15 15%       16 15%; speed ____       17 15%; speed ____       18 15%; speed ____       19 15%; speed ____       20 15%; speed ____         Posttest assessment: stopped test due to leg discomfort and not pcs symptoms  Test stopped at: 7 minutes due to leg discomfort  Post test vitals = 125/86;  110 bpm  Overall symptom level = 0    Motion Sensitivity Quotient   DATE:                    Baseline Level (0-5):                    MSQ Score:    POSITION INTENSITY ADJUSTED  INTENSITY DURATION SCORE   Sitting to Supine 0      Supine to Left Side Lying 0      Supine to Right Side Lying 0      Supine to Sit 0      Left Hallpike -      to  Sitting -      Right Hallpike -      to Sitting -      Sitting Nose to Left Knee 0      to Sitting 0      Sitting Nose to Right Knee 0      to Sitting 0      Sitting Head Rotation (4X) 0      Sitting Head Flex & Ext (4X) 0      Standing  360° Turn to Right 0      Standing 360° Turn to Left 0        Intensity: Scale 0-5 (0= no symptoms and 5= severe symptoms)  Adjusted Intensity: Intensity Level - Baseline Level  Duration: Scale from 0 to 3 for return to baseline (5-10”=1, 11-30”=2, >  30”=3)  Score: Adjusted Intensity + Duration  MOTION SENSITIVITY QUOTIENT % = # Provoking Positions X Total Score   X 100                                                                                                          2048   *If < 16 positions are tested, 2048 is replaced with 8 X (# of positions tested)2                                   Goals Addressed                 This Visit's Progress    • COMPLETED: Mutually agreed upon pain goal        Mutually agreed upon pain goal: 0/10    Pt reports she is able to pace self and manage symptoms.  11-17-21      • COMPLETED: Vestibular PT goals 2021        Short Term Goals: 4-6 weeks  Result Comment/Progress   Patient will decrease Motion Sensitivity Quotient to <5 % for increased functional tolerance.   met    Patient will increase Balance Master SOT composite score to TBA for increased postural control.  met    Patient will increase FGA score to TBA for increased gait stability and balance.   met 30/30   Patient will decrease DVA to TBA for functional improved gaze stability. nt Not tested due to time constraints   Patient will increase GST scores to TBA for improved gaze stability.   nt Not tested due to time constraints   Patient will perform home exercise program with supervision.   met    Patient will tolerate 30 minutes of cardiovascular conditioning at 40-60% max HR met    Patient will be negative for BPPV in all canals to reduce symptoms with functional mobility.  met    Patient will improve score on [place] to <30 for decreased report of symptoms with daily activities. met        Long Term Goals: 8-12 weeks Result Comment/Progress   Patient will decrease Motion Sensitivity Quotient to <2% for increased functional tolerance.   met 0   Patient will increase Balance Master SOT composite score to WNL for increased postural control. met    Patient will increase FGA score to >28/30 for increased gait stability and balance.   met    Patient will decrease DVA to <2 lines for functional improved gaze stability. nt Not tested due to time constraints/ unexpected dc   Patient will increase GST scores to 150 for improved gaze stability.   nt Not tested due to time constraints/unexpected dc   Patient will perform home exercise program independently.   met    Patient will tolerate 60 minutes of cardiovascular conditioning at 60-75% HR max Not met 30 min cardio + 30 min weight training   Patient will demonstrate independence with managing any residual symptoms. met    Patient will return to work full time met    Patient will improve score on Rivermead to <15 for decreased report of symptoms with  daily activities. met    Patient will have no increased symptoms with challenging VOR activities for symptom free high level activities.   met For vor hep; challenging activities in PT not assessed                       Discharge information for [facility]:    Reason for D/C: Goals met  Hospitalization during treatment: No  Increased independence: [place] in HEP,Increased functional activity,Increased functional independence  Increased production assessment: Increased community independence,Return to household activities,Return to driving,Return to work/school/volunteer activities,Return to participation in hobbies/leisure pursuits  Interrupted for medical reason: No  Skin integrity: Intact

## 2021-11-19 NOTE — PROGRESS NOTES
OT DISCHARGE NOTE FOR OUTPATIENT THERAPY    Patient: Trini Valenzuela   MRN: [id number]  : 1995 26 y.o.  Referring Physician: Dipesh Roth DO  Date of Visit: 2021      Certification Dates:  10/21/21 through 22      Chief Complaints:   Chief Complaint   Patient presents with   • Visual Deficits   •  Difficulty Performing Work/school Tasks   • Decreased recreational/play activity   • Decreased Endurance       Precautions:   Existing Precautions/Restrictions: no known precautions/restrictions    TODAY'S VISIT:     General Information - 21 0905        General Information    Document Type discharge evaluation     Onset of Illness/Injury or Date of Surgery 21     Referring Physician Dr. Roth     Pertinent History of Current Functional Problem Per Dr. Roth: She is a 26-year-old female who sustained a concussion on 2021 as result of falling backwards out of a high back chair in a restaurant. She struck her head on the floor. She not experience any loss of consciousness, seizure activity, or posttraumatic amnesia. She not present to the ER. No brain imaging has been obtained. She followed up with her primary care physician on 2021. She was diagnosed with a concussion and advised on relative cognitive and physical rest. A non-contrast head CT was scheduled for 2021. She took the first week following her injury off from work then attempted to return to work full-time but was unable to do so due to increased symptoms. She resume working half days on 2021. Currently her symptoms include headaches which she describes as located over the vertex, moderate to severe in severity, pressure-like in quality, constant, and not significantly altered with Tylenol. She is endorsing dizziness which she describes more of a lightheadedness and fogginess; she denies any vertigo, loss of balance, falls. She does admit to visual changes with blurred vision and photophobia; she  denies any diplopia. She does wear reading glasses at baseline and her last eye exam was in December 2020. She is experiencing phonophobia, cognitive slowing and fogging, impaired concentration memory, excessive fatigue, and mood instability with feelings of increased irritability and anxiety as well as insomnia with difficulty with sleep induction and maintenance. Her symptom severity score as rated on scat 5 was 51. Her reading tolerance is 30 to 60 minutes in duration. She is working part-time albeit with increased symptoms as noted above. She has resumed driving. There has been no head injury subsequent to that occurred on 9/2/2021     Patient/Family/Caregiver Comments/Observations Patient stated although not completely back to herself, she feels much better and is ready for discharge     Existing Precautions/Restrictions no known precautions/restrictions        OT Time Calculation    Start Time 0905     Stop Time 1000     Time Calculation (min) 55 min                Daily Falls Screen - 11/17/21 0815        Daily Falls Assessment    Patient reported fall since last visit No                Pain/Vitals - 11/17/21 0910        Pain Assessment    Currently in pain No/Denies     Preferred Pain Scale number (Numeric Rating Pain Scale)     Pain: Body location Head     Pain Rating (0-10): Pre Activity 0     Pain Rating (0-10): Activity 0     Pain Rating (0-10): Post Activity 1        Pain Interventions    Intervention  monitored - rest breaks offered     Post Intervention Comments mild headache provoked with complete discharge testing                OT - 11/17/21 0910        Occupational Therapy Encounter Type Details    Occupational Therapy Neuro        OT Frequency and Duration    OT Duration 3 months     OT Custom Frequency and Duration 1-2x/wk     OT Cert From 10/21/21     OT Cert To 01/19/22     Date OT POC was sent to provider 10/21/21     Signed OT Plan of Care received?  Yes                   OBJECTIVE  MEASUREMENTS/DATA:    Vision     Vision - 11/17/21 0952        Vision Assessment    Visual Impairment/Limitations corrective lenses for distance   uses non magnifing blue light glasses for the computer    Impact of Vision Impairment on Function Reduction in computer screen and reading tolerance        Oculomotor Control    Left eye assessed? Yes     Right eye assessed? Yes     Superior assessed? Yes     Inferior assessed? Yes     Diagonal assessed? Yes     Circular assessed? Yes     Left AROM without target ROM Intact     Left oculomotor AROM Discomfort None     Left gaze fixation (10 second hold) Able      Right AROM without target ROM Intact      Right oculomotor AROM Discomfort None     Right gaze fixation (10 second hold) Able     Superior AROM without target ROM Intact      Superior oculomotor AROM Discomfort None     Superior gaze fixation (10 second hold) Able     Inferior AROM without target ROM Intact     Inferior oculomotor AROM Discomfort None     Inferior gaze fixation (10 second hold) Able     Diagonal AROM without target ROM Intact     Diagonal oculomotor AROM Discomfort None     Diagonal gaze fixation (10 second hold) Able     Circular AROM without target ROM Intact     Circular oculomotor AROM Discomfort None     Circular gaze fixation (10 second hold) --   n/a       Eye Teaming    Convergence Intact     Divergence Intact     Accommodation Intact     Smooth Pursuits Intact     3D Tracking Intact     Nystagmus No        Saccadic Fixation Speed    Vertical Saccadic Fixation WFL     Horizontal Saccadic Fixation WFL     Horizontal Saccades H1 5.47 Seconds     Horizontal Saccades H2 5.66 Seconds     Horizontal Saccades H3 5.25 Seconds     Horizontal Saccades H4 5.15 Seconds     Horizontal Saccades trials average 5.38 Seconds     Vertical Saccades V1 4.45 Seconds     Vertical Saccades V2 4.6 Seconds     Vertical Saccades V3 4.68 Seconds     Vertical Saccades V4 4.89 Seconds     Vertical Saccades trials  average 4.66 Seconds     Devendra Devick Test #1 (seconds) 15.9 Seconds     Devendra Devick Test #2 (seconds) 17.19 Seconds     Devnedra Devick Test #3 (seconds) 16.58 Seconds     Devendra Devick Total Time 49.67 Seconds     Comments KD now within functional limits of 51 sec or smaller - but could experience some additional saccades speed post discharge by continuing horizontal saccades exercises     Devendra Devick Errors #1 0     Devendra Devick Errors #2 0     Devendra Devick Errors #3 0     Devendra Devick Total Errors 0        Visual Reaction Timing    Dynavision remains a little slow but patient reports feeling capable while driving - reports no difficulties     Dynavision Score (Mode B, 5 sec light timer) Targets 65     Dynavision Score Avg response time 0.91 Seconds     Dynavision Score Overall Impaired   per the [place] police guideline of 0.75 sec       Symptoms and Outcome Measures    Symptoms Headache;Photophobia;Fatigue;Cognitive Fatigue;Visual changes     [place] Score 19     Symptoms/Comments Mild overall symptoms remain. patient will continue to employ strategies post discharge                 Today's Treatment:    Today's Treatment:     VISION/CONCUSSION OT FLOW SHEET     OT Vision/mTBI  EXERCISES CURRENT SESSION TIME   NEURO RE-ED  CPT 89201 TOTAL TIME FOR SESSION 42 Minutes   Discharge Evaluation Discharge evaluation completed 11/17/21.  See OT note for metrics and information     Dynavision       VTS3/VTS4       CPT       BITs       NVR       Saccadic Fixation 11/02/21  Practiced the Maze packet with vision controlled line drawing down the middle with splitting each one without hitting any other lines.  3/10 eye strain provoked      10/28/21  Practiced level 1 horizontal and vertical saccades         Ocular Motor Control       Visual Tracing 10/28/21  Practiced level 1 tracing     3D Tracking 11/02/21  Practiced seated burst convergence and reaction timing using a small green bounce ball - catch and bounce - 3/10 H/A  "provoked and more \"spacey\"  Provided the 4 types of convergence as follows and practiced:  1) Static  2) Continuous  3) Burst  4) Jump     Visual Perception       Convergence/Divergence    10/28/21  Practiced \"Pencil Push Ups\"     Accommodation       Visual Stimulation       THER ACT  CPT 46283 TOTAL TIME FOR SESSION 0 - 7 Minutes   Pain, Vitals, Meds, Etc.       HEP 10/28/21  Provided the following home exercises:  1. Level 1 horizontal saccades  2. Level 2 vertical saccades  3. Level 1 tracing  4. Pencil Push Ups  5. Seated tracking/reaction timing beach ball toss/catch  11/02/21  6. 4 types of convergence     Visual Endurance        Work/School Simulation        SELF CARE  CPT 70991 TOTAL TIME FOR SESSION 13 Minutes   PCS Symptom Management/Education 11/17/21  Reviewed energy and symptom management techniques and strategies especially as they pertain to discharge and return to work on a normal schedule. Patient demonstrated good verbal understanding of the information and states she has been trying to apply this approach in her daily life and it has been helpful.     educated pt on 1-10 PCS symptom scale.  Pt demonstrates good understanding of education.     ADL/IADLs       GROUP  CPT 14647 TOTAL TIME FOR SESSION Not performed                    Goals Addressed                 This Visit's Progress    • OT goals               Short Term Goals Time Frame Result Comment/Progress   Full ocular motor range of motion and control with mild discomfort 4 weeks  met  As of discharge on 11/17/21   Visual reaction time within or less than .85 second via Dynavision with minimal symptoms 4 weeks  not met  0.91 sec   Saccadic fixation speed of less than 57 seconds as measured by the Devendra Devick Test with minimal symptoms 4 weeks  met    49.67 sec - WNL of 51 sec or smaller     Convergence less than or equal to 7 inches for near-focus tasks of reading and computer use with minimal symptoms 4 weeks  met Approx 5\" to 6\"   Visual " perceptual skills via MVTP4 with standard score equivalent to age range 4 weeks  No testing indicated   Patient will perform IADLs, home management and community activities with mild symptoms as evidenced by a 5-10pt reduction on the [place] Post Concussion Questionnaire 4 weeks  met  At D/C = 21/64    At eval = 41/64      Long Term Goals Time Frame Result Comment/Progress   Patient will complete necessary reading for work/leisure/school, with accommodations if indicated, with absent or manageable symptoms 12 weeks  Met with ability to read for > 60 minutes without + in PCS symptoms   Patient will utilize computer for work/leisure/school tasks with absent or manageable symptoms 12 weeks  Met  with ability to utilize computer for > 60 minutes without + in PCS symptoms   Patient will return to work/school with full or modified duties with absent or manageable symptoms 12 weeks  Met  back to work from home on the computer  She is an    Patient will perform IADLs and community activities with absent or manageable symptoms; as measured by improvement in [place] Questionnaire by <20 points. 12 weeks  Met     Patient will be independent with visual home exercise program 12 weeks  Met     Visual reaction time within or less than .75 seconds via Dynavision with minimal symptoms 12 weeks Not Met  0.91 sec - but pt is driving without complaint                    Clinical Impression:   Patient is back to work and reports she is tolerating it with manageable symptoms. She will continue to do some of her horizontal saccades and reaction timing activities at home post discharge to maximize her long term skills and task endurance.      Discharge information for [facility]:    Reason for D/C: Maximum potential met (did not reach reaction timing goal)  Hospitalization during treatment: No  Increased independence: [place] in HEP,Increased functional activity,Increased functional independence  Increased  production assessment: Increased community independence,Return to work/school/volunteer activities,Return to household activities,Return to participation in hobbies/leisure pursuits  Interrupted for medical reason: No  Skin integrity: Intact (per pt report)

## 2021-11-19 NOTE — OP OT TREATMENT LOG
"Today's Treatment:     VISION/CONCUSSION OT FLOW SHEET     OT Vision/mTBI  EXERCISES CURRENT SESSION TIME   NEURO RE-ED  CPT 18460 TOTAL TIME FOR SESSION 42 Minutes   Discharge Evaluation Discharge evaluation completed 11/17/21.  See OT note for metrics and information     Dynavision       VTS3/VTS4       CPT       BITs       NVR       Saccadic Fixation 11/02/21  Practiced the Maze packet with vision controlled line drawing down the middle with splitting each one without hitting any other lines.  3/10 eye strain provoked      10/28/21  Practiced level 1 horizontal and vertical saccades         Ocular Motor Control       Visual Tracing 10/28/21  Practiced level 1 tracing     3D Tracking 11/02/21  Practiced seated burst convergence and reaction timing using a small green bounce ball - catch and bounce - 3/10 H/A provoked and more \"spacey\"  Provided the 4 types of convergence as follows and practiced:  1) Static  2) Continuous  3) Burst  4) Jump     Visual Perception       Convergence/Divergence    10/28/21  Practiced \"Pencil Push Ups\"     Accommodation       Visual Stimulation       THER ACT  CPT 08302 TOTAL TIME FOR SESSION 0 - 7 Minutes   Pain, Vitals, Meds, Etc.       HEP 10/28/21  Provided the following home exercises:  1. Level 1 horizontal saccades  2. Level 2 vertical saccades  3. Level 1 tracing  4. Pencil Push Ups  5. Seated tracking/reaction timing beach ball toss/catch  11/02/21  6. 4 types of convergence     Visual Endurance        Work/School Simulation        SELF CARE  CPT 26252 TOTAL TIME FOR SESSION 13 Minutes   PCS Symptom Management/Education 11/17/21  Reviewed energy and symptom management techniques and strategies especially as they pertain to discharge and return to work on a normal schedule. Patient demonstrated good verbal understanding of the information and states she has been trying to apply this approach in her daily life and it has been helpful.     educated pt on 1-10 PCS symptom scale.  " Pt demonstrates good understanding of education.     ADL/IADLs       GROUP  CPT 95470 TOTAL TIME FOR SESSION Not performed